# Patient Record
Sex: MALE | Race: WHITE | NOT HISPANIC OR LATINO | Employment: FULL TIME | URBAN - NONMETROPOLITAN AREA
[De-identification: names, ages, dates, MRNs, and addresses within clinical notes are randomized per-mention and may not be internally consistent; named-entity substitution may affect disease eponyms.]

---

## 2021-07-13 ENCOUNTER — APPOINTMENT (OUTPATIENT)
Dept: LAB | Facility: CLINIC | Age: 41
End: 2021-07-13
Payer: COMMERCIAL

## 2021-07-13 ENCOUNTER — OFFICE VISIT (OUTPATIENT)
Dept: INTERNAL MEDICINE CLINIC | Facility: CLINIC | Age: 41
End: 2021-07-13
Payer: COMMERCIAL

## 2021-07-13 VITALS
SYSTOLIC BLOOD PRESSURE: 112 MMHG | BODY MASS INDEX: 27.85 KG/M2 | DIASTOLIC BLOOD PRESSURE: 72 MMHG | WEIGHT: 217 LBS | HEIGHT: 74 IN | OXYGEN SATURATION: 97 % | TEMPERATURE: 98.4 F | HEART RATE: 89 BPM

## 2021-07-13 DIAGNOSIS — F32.A ANXIETY AND DEPRESSION: ICD-10-CM

## 2021-07-13 DIAGNOSIS — R39.198 DIFFICULTY VOIDING: ICD-10-CM

## 2021-07-13 DIAGNOSIS — G89.29 CHRONIC PAIN OF BOTH KNEES: ICD-10-CM

## 2021-07-13 DIAGNOSIS — Z00.00 ROUTINE ADULT HEALTH MAINTENANCE: ICD-10-CM

## 2021-07-13 DIAGNOSIS — F41.9 ANXIETY AND DEPRESSION: ICD-10-CM

## 2021-07-13 DIAGNOSIS — M25.562 CHRONIC PAIN OF BOTH KNEES: ICD-10-CM

## 2021-07-13 DIAGNOSIS — H91.93 BILATERAL HEARING LOSS, UNSPECIFIED HEARING LOSS TYPE: ICD-10-CM

## 2021-07-13 DIAGNOSIS — M25.561 CHRONIC PAIN OF BOTH KNEES: ICD-10-CM

## 2021-07-13 DIAGNOSIS — Z13.29 SCREENING FOR THYROID DISORDER: ICD-10-CM

## 2021-07-13 DIAGNOSIS — Z13.6 SCREENING FOR CARDIOVASCULAR CONDITION: ICD-10-CM

## 2021-07-13 DIAGNOSIS — Z00.00 ROUTINE ADULT HEALTH MAINTENANCE: Primary | ICD-10-CM

## 2021-07-13 LAB
ALBUMIN SERPL BCP-MCNC: 3.8 G/DL (ref 3.5–5)
ALP SERPL-CCNC: 90 U/L (ref 46–116)
ALT SERPL W P-5'-P-CCNC: 42 U/L (ref 12–78)
ANION GAP SERPL CALCULATED.3IONS-SCNC: 5 MMOL/L (ref 4–13)
AST SERPL W P-5'-P-CCNC: 22 U/L (ref 5–45)
BASOPHILS # BLD AUTO: 0.02 THOUSANDS/ΜL (ref 0–0.1)
BASOPHILS NFR BLD AUTO: 0 % (ref 0–1)
BILIRUB SERPL-MCNC: 1.09 MG/DL (ref 0.2–1)
BUN SERPL-MCNC: 14 MG/DL (ref 5–25)
CALCIUM SERPL-MCNC: 9.4 MG/DL (ref 8.3–10.1)
CHLORIDE SERPL-SCNC: 105 MMOL/L (ref 100–108)
CHOLEST SERPL-MCNC: 201 MG/DL (ref 50–200)
CO2 SERPL-SCNC: 27 MMOL/L (ref 21–32)
CREAT SERPL-MCNC: 1.02 MG/DL (ref 0.6–1.3)
EOSINOPHIL # BLD AUTO: 0.12 THOUSAND/ΜL (ref 0–0.61)
EOSINOPHIL NFR BLD AUTO: 2 % (ref 0–6)
ERYTHROCYTE [DISTWIDTH] IN BLOOD BY AUTOMATED COUNT: 12.4 % (ref 11.6–15.1)
GFR SERPL CREATININE-BSD FRML MDRD: 92 ML/MIN/1.73SQ M
GLUCOSE P FAST SERPL-MCNC: 92 MG/DL (ref 65–99)
HCT VFR BLD AUTO: 48.5 % (ref 36.5–49.3)
HDLC SERPL-MCNC: 49 MG/DL
HGB BLD-MCNC: 16.3 G/DL (ref 12–17)
IMM GRANULOCYTES # BLD AUTO: 0.01 THOUSAND/UL (ref 0–0.2)
IMM GRANULOCYTES NFR BLD AUTO: 0 % (ref 0–2)
LDLC SERPL CALC-MCNC: 126 MG/DL (ref 0–100)
LYMPHOCYTES # BLD AUTO: 1.53 THOUSANDS/ΜL (ref 0.6–4.47)
LYMPHOCYTES NFR BLD AUTO: 27 % (ref 14–44)
MCH RBC QN AUTO: 30.4 PG (ref 26.8–34.3)
MCHC RBC AUTO-ENTMCNC: 33.6 G/DL (ref 31.4–37.4)
MCV RBC AUTO: 91 FL (ref 82–98)
MONOCYTES # BLD AUTO: 0.59 THOUSAND/ΜL (ref 0.17–1.22)
MONOCYTES NFR BLD AUTO: 10 % (ref 4–12)
NEUTROPHILS # BLD AUTO: 3.5 THOUSANDS/ΜL (ref 1.85–7.62)
NEUTS SEG NFR BLD AUTO: 61 % (ref 43–75)
NONHDLC SERPL-MCNC: 152 MG/DL
NRBC BLD AUTO-RTO: 0 /100 WBCS
PLATELET # BLD AUTO: 221 THOUSANDS/UL (ref 149–390)
PMV BLD AUTO: 11.6 FL (ref 8.9–12.7)
POTASSIUM SERPL-SCNC: 4.3 MMOL/L (ref 3.5–5.3)
PROT SERPL-MCNC: 8 G/DL (ref 6.4–8.2)
RBC # BLD AUTO: 5.36 MILLION/UL (ref 3.88–5.62)
SODIUM SERPL-SCNC: 137 MMOL/L (ref 136–145)
TRIGL SERPL-MCNC: 128 MG/DL
TSH SERPL DL<=0.05 MIU/L-ACNC: 3.12 UIU/ML (ref 0.36–3.74)
WBC # BLD AUTO: 5.77 THOUSAND/UL (ref 4.31–10.16)

## 2021-07-13 PROCEDURE — 3008F BODY MASS INDEX DOCD: CPT | Performed by: NURSE PRACTITIONER

## 2021-07-13 PROCEDURE — 99385 PREV VISIT NEW AGE 18-39: CPT | Performed by: NURSE PRACTITIONER

## 2021-07-13 PROCEDURE — 3725F SCREEN DEPRESSION PERFORMED: CPT | Performed by: NURSE PRACTITIONER

## 2021-07-13 PROCEDURE — 80053 COMPREHEN METABOLIC PANEL: CPT

## 2021-07-13 PROCEDURE — 80061 LIPID PANEL: CPT

## 2021-07-13 PROCEDURE — 84443 ASSAY THYROID STIM HORMONE: CPT

## 2021-07-13 PROCEDURE — 36415 COLL VENOUS BLD VENIPUNCTURE: CPT

## 2021-07-13 PROCEDURE — 85025 COMPLETE CBC W/AUTO DIFF WBC: CPT

## 2021-07-13 PROCEDURE — 1036F TOBACCO NON-USER: CPT | Performed by: NURSE PRACTITIONER

## 2021-07-13 NOTE — PROGRESS NOTES
Assessment/Plan: Will order fasting labs on patient  Will hold on PSA and will check with his insurance to see if this is covered  He is deferring the covid vaccine  Will notify once labs are back  Patient does contract for safety  Will follow up in one year or sooner if need be  No problem-specific Assessment & Plan notes found for this encounter  Problem List Items Addressed This Visit        Other    Routine adult health maintenance - Primary    Relevant Orders    Comprehensive metabolic panel    CBC and differential    BMI 27 0-27 9,adult    Relevant Orders    Comprehensive metabolic panel    CBC and differential    Anxiety and depression    Relevant Orders    Comprehensive metabolic panel    CBC and differential    Screening for cardiovascular condition    Relevant Orders    Lipid panel    Screening for thyroid disorder    Relevant Orders    TSH, 3rd generation with Free T4 reflex      Other Visit Diagnoses     Difficulty voiding        Relevant Orders    PSA, Total Screen            Subjective:      Patient ID: Roderick Dejesus is a 45 y o  male  Ellan Barrier is to establish care  He has not had a provider in a long time  He denies any medical issues  He states he was in [place] for two years and has been dealing with some anxiety and depression from that  He does not want to take any medication and when he is ready will start counseling  He denies any chest pain, SOB, or palpitations  He does have anxiety and depression but does deal with this on his own  He denies any drug, alcohol, or tobacco use  He states he is having some issue when voiding he feels like he has to continually go and never fully empties his bladder  The following portions of the patient's history were reviewed and updated as appropriate: He  has no past medical history on file    He   Patient Active Problem List    Diagnosis Date Noted    Routine adult health maintenance 07/13/2021    BMI 27 0-27 9,adult 07/13/2021    Anxiety and depression 07/13/2021    Screening for cardiovascular condition 07/13/2021    Screening for thyroid disorder 07/13/2021     He  has no past surgical history on file  His family history is not on file  He  reports that he has never smoked  He has never used smokeless tobacco  He reports that he does not drink alcohol and does not use drugs  No current outpatient medications on file  No current facility-administered medications for this visit  No current outpatient medications on file prior to visit  No current facility-administered medications on file prior to visit  He has No Known Allergies       Review of Systems   Genitourinary: Positive for difficulty urinating  All other systems reviewed and are negative  Objective:      /72 (BP Location: Right arm, Patient Position: Sitting, Cuff Size: Adult)   Pulse 89   Temp 98 4 °F (36 9 °C) (Temporal)   Ht 6' 2" (1 88 m)   Wt 98 4 kg (217 lb)   SpO2 97%   BMI 27 86 kg/m²          Physical Exam  Vitals reviewed  Constitutional:       Appearance: Normal appearance  He is normal weight  HENT:      Head: Normocephalic and atraumatic  Right Ear: Tympanic membrane, ear canal and external ear normal       Left Ear: Tympanic membrane, ear canal and external ear normal       Nose: Nose normal       Mouth/Throat:      Mouth: Mucous membranes are moist       Pharynx: Oropharynx is clear  Eyes:      Extraocular Movements: Extraocular movements intact  Conjunctiva/sclera: Conjunctivae normal       Pupils: Pupils are equal, round, and reactive to light  Cardiovascular:      Rate and Rhythm: Normal rate and regular rhythm  Pulses: Normal pulses  Heart sounds: Normal heart sounds  Pulmonary:      Effort: Pulmonary effort is normal       Breath sounds: Normal breath sounds  Abdominal:      General: Abdomen is flat  Bowel sounds are normal       Palpations: Abdomen is soft     Musculoskeletal: General: Normal range of motion  Cervical back: Normal range of motion and neck supple  Skin:     General: Skin is warm and dry  Capillary Refill: Capillary refill takes less than 2 seconds  Neurological:      General: No focal deficit present  Mental Status: He is alert and oriented to person, place, and time  Mental status is at baseline  Psychiatric:         Mood and Affect: Mood normal          Behavior: Behavior normal          Thought Content: Thought content normal          Judgment: Judgment normal          BMI Counseling: Body mass index is 27 86 kg/m²  The BMI is above normal  Nutrition recommendations include reducing portion sizes, decreasing overall calorie intake, 3-5 servings of fruits/vegetables daily, reducing fast food intake, consuming healthier snacks, decreasing soda and/or juice intake, moderation in carbohydrate intake, increasing intake of lean protein, reducing intake of saturated fat and trans fat and reducing intake of cholesterol

## 2021-07-13 NOTE — PATIENT INSTRUCTIONS
Low Fat Diet   AMBULATORY CARE:   A low-fat diet  is an eating plan that is low in total fat, unhealthy fat, and cholesterol  You may need to follow a low-fat diet if you have trouble digesting or absorbing fat  You may also need to follow this diet if you have high cholesterol  You can also lower your cholesterol by increasing the amount of fiber in your diet  Soluble fiber is a type of fiber that helps to decrease cholesterol levels  Different types of fat in food:   · Limit unhealthy fats  A diet that is high in cholesterol, saturated fat, and trans fat may cause unhealthy cholesterol levels  Unhealthy cholesterol levels increase your risk of heart disease  ? Cholesterol:  Limit intake of cholesterol to less than 200 mg per day  Cholesterol is found in meat, eggs, and dairy  ? Saturated fat:  Limit saturated fat to less than 7% of your total daily calories  Ask your dietitian how many calories you need each day  Saturated fat is found in butter, cheese, ice cream, whole milk, and palm oil  Saturated fat is also found in meat, such as beef, pork, chicken skin, and processed meats  Processed meats include sausage, hot dogs, and bologna  ? Trans fat:  Avoid trans fat as much as possible  Trans fat is used in fried and baked foods  Foods that say trans fat free on the label may still have up to 0 5 grams of trans fat per serving  · Include healthy fats  Replace foods that are high in saturated and trans fat with foods high in healthy fats  This may help to decrease high cholesterol levels  ? Monounsaturated fats: These are found in avocados, nuts, and vegetable oils, such as olive, canola, and sunflower oil  ? Polyunsaturated fats: These can be found in vegetable oils, such as soybean or corn oil  Omega-3 fats can help to decrease the risk of heart disease  Omega-3 fats are found in fish, such as salmon, herring, trout, and tuna   Omega-3 fats can also be found in plant foods, such as walnuts, flaxseed, soybeans, and canola oil  Foods to limit or avoid:   · Grains:      ? Snacks that are made with partially hydrogenated oils, such as chips, regular crackers, and butter-flavored popcorn    ? High-fat baked goods, such as biscuits, croissants, doughnuts, pies, cookies, and pastries    · Dairy:      ? Whole milk, 2% milk, and yogurt and ice cream made with whole milk    ? Half and half creamer, heavy cream, and whipping cream    ? Cheese, cream cheese, and sour cream    · Meats and proteins:      ? High-fat cuts of meat (T-bone steak, regular hamburger, and ribs)    ? Fried meat, poultry (turkey and chicken), and fish    ? Poultry (chicken and turkey) with skin    ? Cold cuts (salami or bologna), hot dogs, crowley, and sausage    ? Whole eggs and egg yolks    · Vegetables and fruits with added fat:      ? Fried vegetables or vegetables in butter or high-fat sauces, such as cream or cheese sauces    ? Fried fruit or fruit served with butter or cream    · Fats:      ? Butter, stick margarine, and shortening    ? Coconut, palm oil, and palm kernel oil    Foods to include:   · Grains:      ? Whole-grain breads, cereals, pasta, and brown rice    ? Low-fat crackers and pretzels    · Vegetables and fruits:      ? Fresh, frozen, or canned vegetables (no salt or low-sodium)    ? Fresh, frozen, dried, or canned fruit (canned in light syrup or fruit juice)    ? Avocado    · Low-fat dairy products:      ? Nonfat (skim) or 1% milk    ? Nonfat or low-fat cheese, yogurt, and cottage cheese    · Meats and proteins:      ? Chicken or turkey with no skin    ? Baked or broiled fish    ? Lean beef and pork (loin, round, extra lean hamburger)    ? Beans and peas, unsalted nuts, soy products    ? Egg whites and substitutes    ? Seeds and nuts    · Fats:      ? Unsaturated oil, such as canola, olive, peanut, soybean, or sunflower oil    ? Soft or liquid margarine and vegetable oil spread    ?  Low-fat salad dressing    Other ways to decrease fat:   · Read food labels before you buy foods  Choose foods that have less than 30% of calories from fat  Choose low-fat or fat-free dairy products  Remember that fat free does not mean calorie free  These foods still contain calories, and too many calories can lead to weight gain  · Trim fat from meat and avoid fried food  Trim all visible fat from meat before you cook it  Remove the skin from poultry  Do not willard meat, fish, or poultry  Bake, roast, boil, or broil these foods instead  Avoid fried foods  Eat a baked potato instead of [place] fries  Steam vegetables instead of sautéing them in butter  · Add less fat to foods  Use imitation crowley bits on salads and baked potatoes instead of regular crowley bits  Use fat-free or low-fat salad dressings instead of regular dressings  Use low-fat or nonfat butter-flavored topping instead of regular butter or margarine on popcorn and other foods  Ways to decrease fat in recipes:  Replace high-fat ingredients with low-fat or nonfat ones  This may cause baked goods to be drier than usual  You may need to use nonfat cooking spray on pans to prevent food from sticking  You also may need to change the amount of other ingredients, such as water, in the recipe  Try the following:  · Use low-fat or light margarine instead of regular margarine or shortening  · Use lean ground turkey breast or chicken, or lean ground beef (less than 5% fat) instead of hamburger  · Add 1 teaspoon of canola oil to 8 ounces of skim milk instead of using cream or half and half  · Use grated zucchini, carrots, or apples in breads instead of coconut  · Use blenderized, low-fat cottage cheese, plain tofu, or low-fat ricotta cheese instead of cream cheese  · Use 1 egg white and 1 teaspoon of canola oil, or use ¼ cup (2 ounces) of fat-free egg substitute instead of a whole egg       · Replace half of the oil that is called for in a recipe with applesauce when you bake  Use 3 tablespoons of cocoa powder and 1 tablespoon of canola oil instead of a square of baking chocolate  How to increase fiber:  Eat enough high-fiber foods to get 20 to 30 grams of fiber every day  Slowly increase your fiber intake to avoid stomach cramps, gas, and other problems  · Eat 3 ounces of whole-grain foods each day  An ounce is about 1 slice of bread  Eat whole-grain breads, such as whole-wheat bread  Whole wheat, whole-wheat flour, or other whole grains should be listed as the first ingredient on the food label  Replace white flour with whole-grain flour or use half of each in recipes  Whole-grain flour is heavier than white flour, so you may have to add more yeast or baking powder  · Eat a high-fiber cereal for breakfast   Oatmeal is a good source of soluble fiber  Look for cereals that have bran or fiber in the name  Choose whole-grain products, such as brown rice, barley, and whole-wheat pasta  · Eat more beans, peas, and lentils  For example, add beans to soups or salads  Eat at least 5 cups of fruits and vegetables each day  Eat fruits and vegetables with the peel because the peel is high in fiber  © Copyright [address] Information is for End User's use only and may not be sold, redistributed or otherwise used for commercial purposes  All illustrations and images included in CareNotes® are the copyrighted property of A D A M , [employer]  or [address] [place] [address]  The above information is an  only  It is not intended as medical advice for individual conditions or treatments  Talk to your doctor, nurse or pharmacist before following any medical regimen to see if it is safe and effective for you  Heart Healthy Diet   AMBULATORY CARE:   A heart healthy diet  is an eating plan low in unhealthy fats and sodium (salt)  The plan is high in healthy fats and fiber   A heart healthy diet helps improve your cholesterol levels and lowers your risk for heart disease and stroke  A dietitian will teach you how to read and understand food labels  Heart healthy diet guidelines to follow:   · Choose foods that contain healthy fats  ? Unsaturated fats  include monounsaturated and polyunsaturated fats  Unsaturated fat is found in foods such as soybean, canola, olive, corn, and safflower oils  It is also found in soft tub margarine that is made with liquid vegetable oil  ? Omega-3 fat  is found in certain fish, such as salmon, tuna, and trout, and in walnuts and flaxseed  Eat fish high in omega-3 fats at least 2 times a week  · Get 20 to 30 grams of fiber each day  Fruits, vegetables, whole-grain foods, and legumes (cooked beans) are good sources of fiber  · Limit or do not have unhealthy fats  ? Cholesterol  is found in animal foods, such as eggs and lobster, and in dairy products made from whole milk  Limit cholesterol to less than 200 mg each day  ? Saturated fat  is found in meats, such as crowley and hamburger  It is also found in chicken or turkey skin, whole milk, and butter  Limit saturated fat to less than 7% of your total daily calories  ? Trans fat  is found in packaged foods, such as potato chips and cookies  It is also in hard margarine, some fried foods, and shortening  Do not eat foods that contain trans fats  · Limit sodium as directed  You may be told to limit sodium to 2,000 to 2,300 mg each day  Choose low-sodium or no-salt-added foods  Add little or no salt to food you prepare  Use herbs and spices in place of salt  Include the following in your heart healthy plan:  Ask your dietitian or healthcare provider how many servings to have from each of the following food groups:  · Grains:      ? Whole-wheat breads, cereals, and pastas, and brown rice    ? Low-fat, low-sodium crackers and chips    · Vegetables:      ? Broccoli, green beans, green peas, and spinach    ? Collards, kale, and lima beans    ?  Carrots, sweet potatoes, tomatoes, and peppers    ? Canned vegetables with no salt added    · Fruits:      ? Bananas, peaches, pears, and pineapple    ? Grapes, raisins, and dates    ? Oranges, tangerines, grapefruit, orange juice, and grapefruit juice    ? Apricots, mangoes, melons, and papaya    ? Raspberries and strawberries    ? Canned fruit with no added sugar    · Low-fat dairy:      ? Nonfat (skim) milk, 1% milk, and low-fat almond, cashew, or soy milks fortified with calcium    ? Low-fat cheese, regular or frozen yogurt, and cottage cheese    · Meats and proteins:      ? Lean cuts of beef and pork (loin, leg, round), skinless chicken and turkey    ? Legumes, soy products, egg whites, or nuts    Limit or do not include the following in your heart healthy plan:   · Unhealthy fats and oils:      ? Whole or 2% milk, cream cheese, sour cream, or cheese    ? High-fat cuts of beef (T-bone steaks, ribs), chicken or turkey with skin, and organ meats such as liver    ? Butter, stick margarine, shortening, and cooking oils such as coconut or palm oil    · Foods and liquids high in sodium:      ? Packaged foods, such as frozen dinners, cookies, macaroni and cheese, and cereals with more than 300 mg of sodium per serving    ? Vegetables with added sodium, such as instant potatoes, vegetables with added sauces, or regular canned vegetables    ? Cured or smoked meats, such as hot dogs, crowley, and sausage    ? High-sodium ketchup, barbecue sauce, salad dressing, pickles, olives, soy sauce, or miso    · Foods and liquids high in sugar:      ? Candy, cake, cookies, pies, or doughnuts    ? Soft drinks (soda), sports drinks, or sweetened tea    ? Canned or dry mixes for cakes, soups, sauces, or gravies    Other healthy heart guidelines:   · Do not smoke  Nicotine and other chemicals in cigarettes and cigars can cause lung and heart damage  Ask your healthcare provider for information if you currently smoke and need help to quit  E-cigarettes or smokeless tobacco still contain nicotine  Talk to your healthcare provider before you use these products  · Limit or do not drink alcohol as directed  Alcohol can damage your heart and raise your blood pressure  Your healthcare provider may give you specific daily and weekly limits  The general recommended limit is 1 drink a day for women 21 or older and for men 72 or older  Do not have more than 3 drinks in a day or 7 in a week  The recommended limit is 2 drinks a day for men 24to 59years of age  Do not have more than 4 drinks in a day or 14 in a week  A drink of alcohol is 12 ounces of beer, 5 ounces of wine, or 1½ ounces of liquor  · Exercise regularly  Exercise can help you maintain a healthy weight and improve your blood pressure and cholesterol levels  Regular exercise can also decrease your risk for heart problems  Ask your healthcare provider about the best exercise plan for you  Do not start an exercise program without asking your healthcare provider  Follow up with your doctor or cardiologist as directed:  Write down your questions so you remember to ask them during your visits  © Copyright [address] Information is for End User's use only and may not be sold, redistributed or otherwise used for commercial purposes  All illustrations and images included in CareNotes® are the copyrighted property of A D A M , Inc  or [address] [place] [address]  The above information is an  only  It is not intended as medical advice for individual conditions or treatments  Talk to your doctor, nurse or pharmacist before following any medical regimen to see if it is safe and effective for you

## 2021-08-09 ENCOUNTER — TELEPHONE (OUTPATIENT)
Dept: OTHER | Facility: OTHER | Age: 41
End: 2021-08-09

## 2021-08-09 NOTE — TELEPHONE ENCOUNTER
Patient canceled today's appointment  Patient has to work  Patient advised to call back during office hours to reschedule appointment

## 2021-08-12 ENCOUNTER — OFFICE VISIT (OUTPATIENT)
Dept: AUDIOLOGY | Facility: CLINIC | Age: 41
End: 2021-08-12
Payer: COMMERCIAL

## 2021-08-12 ENCOUNTER — APPOINTMENT (OUTPATIENT)
Dept: RADIOLOGY | Facility: CLINIC | Age: 41
End: 2021-08-12
Payer: COMMERCIAL

## 2021-08-12 VITALS
HEIGHT: 74 IN | WEIGHT: 217 LBS | SYSTOLIC BLOOD PRESSURE: 123 MMHG | DIASTOLIC BLOOD PRESSURE: 90 MMHG | TEMPERATURE: 98.1 F | BODY MASS INDEX: 27.85 KG/M2 | HEART RATE: 76 BPM

## 2021-08-12 DIAGNOSIS — M25.562 CHRONIC PAIN OF BOTH KNEES: ICD-10-CM

## 2021-08-12 DIAGNOSIS — G89.29 CHRONIC PAIN OF BOTH KNEES: ICD-10-CM

## 2021-08-12 DIAGNOSIS — H91.93 BILATERAL HEARING LOSS, UNSPECIFIED HEARING LOSS TYPE: ICD-10-CM

## 2021-08-12 DIAGNOSIS — H93.13 TINNITUS OF BOTH EARS: Primary | ICD-10-CM

## 2021-08-12 DIAGNOSIS — M25.561 CHRONIC PAIN OF BOTH KNEES: ICD-10-CM

## 2021-08-12 DIAGNOSIS — M17.0 OSTEOARTHRITIS OF BOTH KNEES, UNSPECIFIED OSTEOARTHRITIS TYPE: Primary | ICD-10-CM

## 2021-08-12 PROCEDURE — 73564 X-RAY EXAM KNEE 4 OR MORE: CPT

## 2021-08-12 PROCEDURE — 99204 OFFICE O/P NEW MOD 45 MIN: CPT | Performed by: FAMILY MEDICINE

## 2021-08-12 PROCEDURE — 1036F TOBACCO NON-USER: CPT | Performed by: FAMILY MEDICINE

## 2021-08-12 PROCEDURE — 92567 TYMPANOMETRY: CPT | Performed by: AUDIOLOGIST-HEARING AID FITTER

## 2021-08-12 PROCEDURE — 3008F BODY MASS INDEX DOCD: CPT | Performed by: FAMILY MEDICINE

## 2021-08-12 PROCEDURE — 92552 PURE TONE AUDIOMETRY AIR: CPT | Performed by: AUDIOLOGIST-HEARING AID FITTER

## 2021-08-12 PROCEDURE — 92556 SPEECH AUDIOMETRY COMPLETE: CPT | Performed by: AUDIOLOGIST-HEARING AID FITTER

## 2021-08-12 NOTE — PROGRESS NOTES
HEARING EVALUATION    Name:  Frandy Cortes  :  1980  Age:  37 y o  Date of Evaluation: 21     History: Tinnitus  Reason for visit: Frandy Cortes is being seen today at the request of Dr Mariela Mei for an evaluation of hearing  Sherrillclara Goodman reports a bilateral intermittent tinnitus that is considered to be very bothersome  There is some hearing difficulty noted  Denied ear pain, aural fullness, and a family history of hearing loss  There is an extensive history of loud noise exposure through [employer]  EVALUATION:    Otoscopic Evaluation:   Right Ear: Clear and healthy ear canal and tympanic membrane   Left Ear: Clear and healthy ear canal and tympanic membrane    Tympanometry:   Right: Type A - normal middle ear pressure and compliance   Left: Type A - normal middle ear pressure and compliance    Audiogram Results:  Pure tone testing revealed normal hearing sensitivity bilaterally  SRT and PTA are in agreement indicating good test reliability  Word recognition scores were  excellent bilaterally  *see attached audiogram      RECOMMENDATIONS:  Annual hearing eval and Return to [facility]  for F/U    PATIENT EDUCATION:   Discussed results and recommendations with Sherrill Goodman  Provided brief counseling on tinnitus and encouraged taking advantage of [facility] services  Questions were addressed and the patient was encouraged to contact our department should concerns arise        Marii Brantley   Clinical Audiologist

## 2021-08-12 NOTE — PATIENT INSTRUCTIONS
F/u 6 wks  Begin physical therapy  Consider injection  Possible L medial meniscal tear  Icing/heat/OTC pain meds as needed  Home exercises  Knee Exercises   AMBULATORY CARE:   What you need to know about knee exercises:  Knee exercises help strengthen the muscles around your knee  Strong muscles can help reduce pain and decrease your risk of future injury  Knee exercises also help you heal after an injury or surgery  · Start slow  These are beginning exercises  Ask your healthcare provider if you need to see a physical therapist for more advanced exercises  As you get stronger, you may be able to do more sets of each exercise or add weights  · Stop if you feel pain  It is normal to feel some discomfort at first  Regular exercise will help decrease your discomfort over time  · Do the exercises on both legs  Do this so both knees remain strong  · Warm up before you do knee exercises  Walk or ride a stationary bike for 5 or 10 minutes to warm your muscles  How to perform knee stretches safely:  Always stretch before you do strengthening exercises  Do these stretching exercises again after you do the strengthening exercises  Do these stretches 4 or 5 days a week, or as directed  · Standing calf stretch: Face a wall and place both palms flat on the wall, or hold the back of a chair for balance  Keep a slight bend in your knees  Take a big step backward with one leg  Keep your other leg directly under you  Keep both heels flat and press your hips forward  Hold the stretch for 30 seconds, and then relax for 30 seconds  Switch legs  Repeat 2 or 3 times on each leg  · Standing quadriceps stretch:  Stand and place one hand against a wall or hold the back of a chair for balance  With your weight on one leg, bend your other leg and grab your ankle  Bring your heel toward your buttocks  Hold the stretch for 30 to 60 seconds  Switch legs  Repeat 2 or 3 times on each leg           · Sitting hamstring stretch:  Sit with both legs straight in front of you  Do not point or flex your toes  Place your palms on the floor and slide your hands forward until you feel the stretch  Do not round your back  Hold the stretch for 30 seconds  Repeat 2 or 3 times  How to perform knee strengthening exercises safely:  Do these exercises 4 or 5 days a week, or as directed  · Standing half squats:  Stand with your feet shoulder-width apart  Lean your back against a wall or hold the back of a chair for balance, if needed  Slowly sit down about 10 inches, as if you are going to sit in a chair  Your body weight should be mostly over your heels  Hold the squat for 5 seconds, then rise to a standing position  Do 3 sets of 10 squats to strengthen your buttocks and thighs  · Standing hamstring curls: Face a wall and place both palms flat on the wall, or hold the back of a chair for balance  With your weight on one leg, lift your other foot as close to your buttocks as you can  Hold for 5 seconds and then lower your leg  Do 2 sets of 10 curls on each leg  This exercise strengthens the muscles in the back of your thigh  · Standing calf raises:  Face a wall and place both palms flat on the wall, or hold the back of a chair for balance  Stand up straight, and do not lean  Place all your weight on one leg by lifting the other foot off the floor  Raise the heel of the foot that is on the floor as high as you can and then lower it  Do 2 sets of 10 calf raises on each leg to strengthen your calf muscles  · Straight leg lifts:  Lie on your stomach with straight legs  Fold your arms in front of you and rest your head in your arms  Tighten your leg muscles and raise one leg as high as you can  Hold for 5 seconds, then lower your leg  Do 2 sets of 10 lifts on each leg to strengthen your buttocks  · Sitting leg lifts:  Sit in a chair  Slowly straighten and raise one leg   Squeeze your thigh muscles and hold for 5 seconds  Relax and return your foot to the floor  Do 2 sets of 10 lifts on each leg  This helps strengthen the muscles in the front of your thigh  Contact your healthcare provider if:   · You have new pain or your pain becomes worse  · You have questions or concerns about your condition or care  © Copyright [employer] 2021 Information is for End User's use only and may not be sold, redistributed or otherwise used for commercial purposes  All illustrations and images included in CareNotes® are the copyrighted property of [employer]  or [facility] Vianey Sheffield   The above information is an  only  It is not intended as medical advice for individual conditions or treatments  Talk to your doctor, nurse or pharmacist before following any medical regimen to see if it is safe and effective for you

## 2021-08-12 NOTE — PROGRESS NOTES
[facility] [place] [address]  [facility] [address]  [phone]      Chief Complaint:  Chief Complaint   Patient presents with    Left Knee - Pain    Right Knee - Pain       Vitals:  /90   Pulse 76   Temp 98 1 °F (36 7 °C) (Tympanic)   Ht 6' 2" (1 88 m)   Wt 98 4 kg (217 lb)   BMI 27 86 kg/m²     The following portions of the patient's history were reviewed and updated as appropriate: allergies, current medications, past family history, past medical history, past social history, past surgical history, and problem list       Subjective:   Patient ID: Iris Fraser is a 37 y o  male  Here c/o B knee pain  Pain started about 15 years ago  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 but he didn't say anything- soreness  Pain is getting worse in the last 10 days  His L knee gives out  L worse than R  Denies locks  Swelling in the winter  No pain meds or icing  Using ronnie copper knee braces- helps in winter  Sharp pain  Worse with walking on ballasts  Had XR in army- showed arthritis  Review of Systems   Constitutional: Negative for fatigue and fever  Respiratory: Negative for shortness of breath  Cardiovascular: Negative for chest pain  Gastrointestinal: Negative for abdominal pain and nausea  Genitourinary: Negative for dysuria  Musculoskeletal: Positive for arthralgias and gait problem  Skin: Negative for rash and wound  Neurological: Negative for weakness and headaches  Objective:  Right Knee Exam   Right knee exam is normal     Muscle Strength   The patient has normal right knee strength  Tenderness   The patient is experiencing no tenderness  Other   Swelling: none  Effusion: no effusion present      Left Knee Exam     Muscle Strength   The patient has normal left knee strength  Tenderness   The patient is experiencing tenderness in the medial joint line      Range of Motion The patient has normal left knee ROM  Tests   Johan:  Medial - positive Lateral - negative  Varus: negative Valgus: negative    Other   Swelling: none  Effusion: no effusion present          Observations   Left Knee   Negative for effusion  Right Knee   Negative for effusion  Physical Exam  Vitals and nursing note reviewed  Constitutional:       Appearance: Normal appearance  He is well-developed  HENT:      Head: Normocephalic  Mouth/Throat:      Mouth: Mucous membranes are moist    Eyes:      Extraocular Movements: Extraocular movements intact  Cardiovascular:      Rate and Rhythm: Normal rate and regular rhythm  Heart sounds: Normal heart sounds  Pulmonary:      Effort: Pulmonary effort is normal       Breath sounds: Normal breath sounds  Abdominal:      General: Bowel sounds are normal       Palpations: Abdomen is soft  Musculoskeletal:         General: Tenderness present  Normal range of motion  Cervical back: Normal range of motion  Right knee: No effusion  Left knee: No effusion  Instability Tests: Medial Johan test positive  Lateral Johan test negative  Skin:     General: Skin is warm and dry  Neurological:      General: No focal deficit present  Mental Status: He is alert and oriented to person, place, and time  Psychiatric:         Mood and Affect: Mood normal          Behavior: Behavior normal          Thought Content: Thought content normal          I have personally reviewed pertinent films in PACS and my interpretation is XR-  B knee- mild DJD no fx  Assessment/Plan:  Assessment/Plan   Diagnoses and all orders for this visit:    Osteoarthritis of both knees, unspecified osteoarthritis type  -     Ambulatory referral to Physical Therapy; Future    Chronic pain of both knees  -     Ambulatory referral to Orthopedic Surgery  -     XR knee 4+ vw right injury; Future  -     XR knee 4+ vw left injury;  Future  -     Ambulatory referral to Physical Therapy; Future        Return in about 6 weeks (around 9/23/2021) for Recheck       Lana Kramer MD

## 2021-10-04 ENCOUNTER — OFFICE VISIT (OUTPATIENT)
Dept: OBGYN CLINIC | Facility: CLINIC | Age: 41
End: 2021-10-04
Payer: COMMERCIAL

## 2021-10-04 ENCOUNTER — TELEPHONE (OUTPATIENT)
Dept: INTERNAL MEDICINE CLINIC | Facility: CLINIC | Age: 41
End: 2021-10-04

## 2021-10-04 VITALS
SYSTOLIC BLOOD PRESSURE: 114 MMHG | DIASTOLIC BLOOD PRESSURE: 80 MMHG | BODY MASS INDEX: 28.27 KG/M2 | HEART RATE: 80 BPM | WEIGHT: 220.2 LBS

## 2021-10-04 DIAGNOSIS — S83.207A POSITIVE MCMURRAY TEST OF LEFT KNEE, INITIAL ENCOUNTER: Primary | ICD-10-CM

## 2021-10-04 DIAGNOSIS — M25.562 CHRONIC PAIN OF LEFT KNEE: ICD-10-CM

## 2021-10-04 DIAGNOSIS — G89.29 CHRONIC PAIN OF LEFT KNEE: ICD-10-CM

## 2021-10-04 PROCEDURE — 1036F TOBACCO NON-USER: CPT | Performed by: FAMILY MEDICINE

## 2021-10-04 PROCEDURE — 99214 OFFICE O/P EST MOD 30 MIN: CPT | Performed by: FAMILY MEDICINE

## 2021-10-14 ENCOUNTER — HOSPITAL ENCOUNTER (OUTPATIENT)
Dept: MRI IMAGING | Facility: HOSPITAL | Age: 41
Discharge: HOME | End: 2021-10-14
Attending: FAMILY MEDICINE

## 2021-10-14 DIAGNOSIS — S83.207A POSITIVE MCMURRAY TEST OF LEFT KNEE, INITIAL ENCOUNTER: ICD-10-CM

## 2021-10-14 DIAGNOSIS — G89.29 CHRONIC PAIN OF LEFT KNEE: ICD-10-CM

## 2021-10-14 DIAGNOSIS — M25.562 CHRONIC PAIN OF LEFT KNEE: ICD-10-CM

## 2022-04-07 ENCOUNTER — OFFICE VISIT (OUTPATIENT)
Dept: URGENT CARE | Facility: CLINIC | Age: 42
End: 2022-04-07
Payer: COMMERCIAL

## 2022-04-07 ENCOUNTER — APPOINTMENT (OUTPATIENT)
Dept: RADIOLOGY | Facility: CLINIC | Age: 42
End: 2022-04-07
Payer: COMMERCIAL

## 2022-04-07 VITALS
TEMPERATURE: 97.9 F | OXYGEN SATURATION: 97 % | HEART RATE: 80 BPM | DIASTOLIC BLOOD PRESSURE: 78 MMHG | RESPIRATION RATE: 18 BRPM | SYSTOLIC BLOOD PRESSURE: 136 MMHG

## 2022-04-07 DIAGNOSIS — S59.911A RIGHT FOREARM INJURY, INITIAL ENCOUNTER: ICD-10-CM

## 2022-04-07 DIAGNOSIS — S51.811A LACERATION OF RIGHT FOREARM WITHOUT COMPLICATION, INITIAL ENCOUNTER: Primary | ICD-10-CM

## 2022-04-07 PROCEDURE — G0382 LEV 3 HOSP TYPE B ED VISIT: HCPCS

## 2022-04-07 PROCEDURE — 73090 X-RAY EXAM OF FOREARM: CPT

## 2022-04-07 PROCEDURE — 90471 IMMUNIZATION ADMIN: CPT

## 2022-04-07 PROCEDURE — 90715 TDAP VACCINE 7 YRS/> IM: CPT

## 2022-04-07 PROCEDURE — 12001 RPR S/N/AX/GEN/TRNK 2.5CM/<: CPT

## 2022-04-07 RX ORDER — LIDOCAINE HYDROCHLORIDE AND EPINEPHRINE 10; 10 MG/ML; UG/ML
1 INJECTION, SOLUTION INFILTRATION; PERINEURAL ONCE
Status: DISCONTINUED | OUTPATIENT
Start: 2022-04-07 | End: 2022-04-07

## 2022-04-07 NOTE — LETTER
April 7, 2022     Patient: Ivan Osborn   YOB: 1980   Date of Visit: 4/7/2022       To Whom It May Concern:    Patient was seen on 04/7/2022  He may return to work on 04/7/2022  If you have any questions or concerns, please don't hesitate to call           Sincerely,        REGINALDO Benitez    CC: No Recipients

## 2022-04-07 NOTE — PATIENT INSTRUCTIONS
Return in 7-10 days for suture removal  Keep your wound clean and dry  Keep your wound covered while at work  Monitor for signs and symptoms of infection  PCP follow-up in 3-5 days  Proceed to ER if symptoms worsen  Acetaminophen or ibuprofen for pain  Care For Your Stitches   AMBULATORY CARE:   Stitches,  or sutures, are used to close cuts and wounds on the skin  Stitches need to be removed after your wound has healed  Seek care immediately if:   · Your stitches come apart  · Blood soaks through your bandages  · You suddenly cannot move your injured joint  · You have sudden numbness around your wound  · You see red streaks coming from your wound  Contact your healthcare provider if:   · You have a fever and chills  · Your wound is red, warm, swollen, or leaking pus  · There is a bad smell coming from your wound  · You have increased pain in the wound area  · You have questions or concerns about your condition or care  Care for your stitches:   · Protect the stitches  You may need to cover your stitches with a bandage for 24 to 48 hours, or as directed  Do not bump or hit the suture area  This could open the wound  Do not trim or shorten the ends of your stitches  If they rub on your clothing, put a gauze bandage between the stitches and your clothes  · Clean the area as directed  Carefully wash your wound with soap and water  For mouth and lip wounds, rinse your mouth after meals and at bedtime  Ask your healthcare provider what to use to rinse your mouth  If you have a scalp wound, you may gently wash your hair every 2 days with mild shampoo  Do not use hair products, such as hair spray  Check your wound for signs of infection when you clean it  Signs include redness, swelling, and pus  · Keep the area dry as directed  Wait 12 to 24 hours after you receive your stitches before you take a shower  Take showers instead of baths  Do not take a bath or swim   Your healthcare provider will give you instructions for bathing with your stitches  Help your wound heal:   · Elevate your wound  Keep your wound above the level of your heart as often as you can  This will help decrease swelling and pain  Prop the area on pillows or blankets, if possible, to keep it elevated comfortably  · Limit activity  Do not stretch the skin around your wound  This will help prevent bleeding and swelling  Follow up with your healthcare provider as directed: You may need to return to have your stitches removed  Write down your questions so you remember to ask them during your visits  © Copyright [employer] 2022 Information is for End User's use only and may not be sold, redistributed or otherwise used for commercial purposes  All illustrations and images included in CareNotes® are the copyrighted property of [employer]  or Edel Tucker  The above information is an  only  It is not intended as medical advice for individual conditions or treatments  Talk to your doctor, nurse or pharmacist before following any medical regimen to see if it is safe and effective for you  Acute Wound Care   AMBULATORY CARE:   An acute wound  is an injury that causes a break in the skin  An acute wound can happen suddenly, last a short time, and may heal on its own  Common signs and symptoms of an acute wound:   · A cut, tear, or gash in your skin    · Bleeding, swelling, pain, or trouble moving the affected area    · Dirt or foreign objects inside the wound     · Milky, yellow, green, or brown pus in the wound     · Red, tender, or warm area around the pus    · Fever  Seek care immediately if:   · You have pus or a foul odor coming from the wound  · You have sudden trouble breathing or chest pain  · Blood soaks through your bandage  Contact your healthcare provider if:   · You have muscle, joint, or body aches, sweating, or a fever      · You have more swelling, redness, or bleeding in your wound  · Your skin is itchy, swollen, or you have a rash  · You have questions or concerns about your condition or care  Treatment for an acute wound  may include any of the following:  · Cleansing  is done with soap and water to wash away germs and decrease the risk of infection  Sterile water further cleans the wound  The cleaning is done under high pressure with a catheter tip and large syringe  A solution that kills germs may also be used  · Debridement  is done to clean and remove objects, dirt, or dead tissues from the open wound  Healthcare providers may also drain the wound to clean out pus  · Closure of the wound  is done with stitches, staples, skin adhesive, or other treatments  This may be done if the wound is wide or deep  Stitches may be needed if the wound is in an area that moves a lot, such as the hands, feet, and joints  Stitches may help to keep the wound from getting infected  They may also decrease the amount of scarring you have  Some wounds may heal better without stitches  Wound care:   · If your wound was closed with thin strips of medical tape, keep them clean and dry  The strips of medical tape will fall off on their own  Do not pull them off  · Keep the bandage clean and dry  Do not remove the bandage over your wound unless your healthcare provider says it is okay  · Wash your hands before and after you take care of your wound to prevent infection  · Clean the wound as directed  If you cannot reach the wound, have someone help you  · If you have packing, make sure all the gauze used to pack the wound is taken out and replaced as directed  Keep track of how many gauze dressings are placed inside the wound  Follow up with your healthcare provider as directed:  Write down your questions so you remember to ask them during your visits     © 2016 [address] is for End User's use only and may not be sold, redistributed or otherwise used for commercial purposes  All illustrations and images included in CareNotes® are the copyrighted property of [employer] M , Inc  or You Kumar  The above information is an  only  It is not intended as medical advice for individual conditions or treatments  Talk to your doctor, nurse or pharmacist before following any medical regimen to see if it is safe and effective for you

## 2022-04-07 NOTE — PROGRESS NOTES
[facility]        NAME: Maurisio Malik is a 37 y o  male  : 1980    MRN: [id number]  DATE: 2022  TIME: 12:34 PM      Assessment and Plan     Laceration of right forearm without complication, initial encounter [S57 [id number]]  1  Laceration of right forearm without complication, initial encounter  Tdap Vaccine greater than or equal to 6yo    lidocaine-epinephrine (XYLOCAINE/EPINEPHRINE) 1 %-1:100,000 injection 1 mL    Laceration repair   2  Right forearm injury, initial encounter  XR forearm 2 vw right     tdap given IM  Wet read xray of right forearm shows soft tissue swelling at laceration/avulsion site  No acute bony abnormalities  Will monitor for final read  Patient Instructions   Return in 7-10 days for suture removal  Keep your wound clean and dry  Keep your wound covered while at work  Monitor for signs and symptoms of infection  PCP follow-up in 3-5 days  Proceed to ER if symptoms worsen  Acetaminophen or ibuprofen for pain  Chief Complaint     Chief Complaint   Patient presents with    Laceration     right arm         History of Present Illness     Patient is a 16-year-old male who presents status post fall  States that he fell around 10:30 am in the bathroom  States he slipped on water, falling onto his right arm  States he cut his right arm on the track of a previous sliding glass door  States the sliding door is no longer there  Denies broken glass  Denies hitting head  Denies neck or back pain  Denies LOC  No use of blood thinners or antiplatelets  Patient unsure last tetanus  Review of Systems     Review of Systems   Constitutional: Negative for chills and fever  Eyes: Negative for visual disturbance  Gastrointestinal: Negative for diarrhea, nausea and vomiting  Musculoskeletal: Negative for back pain, joint swelling, neck pain and neck stiffness  Skin: Positive for wound (right forearm and right wrist)     Neurological: Negative for dizziness and headaches  All other systems reviewed and are negative  Current Medications     No current outpatient medications on file  Current Facility-Administered Medications:     lidocaine-epinephrine (XYLOCAINE/EPINEPHRINE) 1 %-1:100,000 injection 1 mL, 1 mL, Infiltration, Once, REGINALDO Pardo    Current Allergies     Allergies as of 04/07/2022    (No Known Allergies)              The following portions of the patient's history were reviewed and updated as appropriate: allergies, current medications, past family history, past medical history, past social history, past surgical history and problem list      History reviewed  No pertinent past medical history  History reviewed  No pertinent surgical history  History reviewed  No pertinent family history  Medications have been verified  Objective     /78   Pulse 80   Temp 97 9 °F (36 6 °C)   Resp 18   SpO2 97%   No LMP for male patient  Physical Exam     Physical Exam  Vitals and nursing note reviewed  Constitutional:       General: He is awake  He is not in acute distress  Appearance: Normal appearance  He is not ill-appearing, toxic-appearing or diaphoretic  HENT:      Head: Normocephalic and atraumatic  Right Ear: No hemotympanum  Left Ear: No hemotympanum  Nose: Nose normal       Mouth/Throat:      Lips: Pink  Mouth: Mucous membranes are moist       Pharynx: Oropharynx is clear  Cardiovascular:      Rate and Rhythm: Normal rate  Pulses: Normal pulses  Heart sounds: Normal heart sounds, S1 normal and S2 normal    Pulmonary:      Effort: Pulmonary effort is normal       Breath sounds: Normal breath sounds and air entry  No stridor  Musculoskeletal:      Right shoulder: Normal       Left shoulder: Normal       Right forearm: Swelling, laceration, tenderness and bony tenderness present  No deformity  Left forearm: Normal       Right wrist: No tenderness  Normal pulse  Left wrist: Normal       Right hand: No tenderness or bony tenderness  Normal strength  Normal sensation  Normal capillary refill  Normal pulse  Arms:       Cervical back: Neck supple  No signs of trauma  No pain with movement, spinous process tenderness or muscular tenderness  Comments: Normal sensation of right arm  No foreign body noted in wounds  Skin:     General: Skin is warm  Capillary Refill: Capillary refill takes less than 2 seconds  Neurological:      Mental Status: He is alert  GCS: GCS eye subscore is 4  GCS verbal subscore is 5  GCS motor subscore is 6  Psychiatric:         Mood and Affect: Mood normal          Behavior: Behavior normal          Thought Content: Thought content normal          Judgment: Judgment normal      Laceration repair    Date/Time: 4/7/2022 12:32 PM  Performed by: REGINALDO Tsang  Authorized by: REGINALDO Tsang   Consent: Verbal consent obtained  Risks and benefits: risks, benefits and alternatives were discussed  Consent given by: patient  Patient understanding: patient states understanding of the procedure being performed  Radiology Images displayed and confirmed  If images not available, report reviewed: imaging studies available  Patient identity confirmed: verbally with patient  Body area: upper extremity  Location details: right lower arm  Laceration length: 1 cm  Foreign bodies: no foreign bodies  Tendon involvement: none  Nerve involvement: none  Vascular damage: no  Anesthesia: local infiltration    Anesthesia:  Local Anesthetic: lidocaine 1% with epinephrine    Sedation:  Patient sedated: no      Wound Dehiscence:  Superficial Wound Dehiscence: simple closure      Procedure Details:  Preparation: Patient was prepped and draped in the usual sterile fashion    Irrigation method: jet lavage  Amount of cleaning: extensive  Skin closure: 4-0 nylon  Number of sutures: 2  Technique: simple  Approximation: close  Approximation difficulty: simple  Dressing: non-adhesive packing strip and gauze roll  Patient tolerance: patient tolerated the procedure well with no immediate complications  Comments: Patient aware that avulsed area in the center of the laceration with scab over time  Bleeding stopped after laceration repaired  Bacitracin and band-aid applied to skin tear on right palm

## 2022-04-18 ENCOUNTER — OFFICE VISIT (OUTPATIENT)
Dept: URGENT CARE | Facility: CLINIC | Age: 42
End: 2022-04-18
Payer: COMMERCIAL

## 2022-04-18 VITALS
OXYGEN SATURATION: 98 % | TEMPERATURE: 98.6 F | SYSTOLIC BLOOD PRESSURE: 131 MMHG | HEART RATE: 87 BPM | DIASTOLIC BLOOD PRESSURE: 78 MMHG | RESPIRATION RATE: 18 BRPM

## 2022-04-18 DIAGNOSIS — Z48.02 ENCOUNTER FOR REMOVAL OF SUTURES: Primary | ICD-10-CM

## 2022-04-18 PROCEDURE — G0382 LEV 3 HOSP TYPE B ED VISIT: HCPCS

## 2022-04-18 NOTE — PATIENT INSTRUCTIONS
Keep your wound clean and covered  Can use neosporin OTC as directed on bottle  Your laceration and avulsion are healing appropriately  PCP follow-up in 3-5 days  Proceed to the ER if symptoms worsen  Acute Wound Care   AMBULATORY CARE:   An acute wound  is an injury that causes a break in the skin  An acute wound can happen suddenly, last a short time, and may heal on its own  Common signs and symptoms of an acute wound:   · A cut, tear, or gash in your skin    · Bleeding, swelling, pain, or trouble moving the affected area    · Dirt or foreign objects inside the wound     · Milky, yellow, green, or brown pus in the wound     · Red, tender, or warm area around the pus    · Fever  Seek care immediately if:   · You have pus or a foul odor coming from the wound  · You have sudden trouble breathing or chest pain  · Blood soaks through your bandage  Contact your healthcare provider if:   · You have muscle, joint, or body aches, sweating, or a fever  · You have more swelling, redness, or bleeding in your wound  · Your skin is itchy, swollen, or you have a rash  · You have questions or concerns about your condition or care  Treatment for an acute wound  may include any of the following:  · Cleansing  is done with soap and water to wash away germs and decrease the risk of infection  Sterile water further cleans the wound  The cleaning is done under high pressure with a catheter tip and large syringe  A solution that kills germs may also be used  · Debridement  is done to clean and remove objects, dirt, or dead tissues from the open wound  Healthcare providers may also drain the wound to clean out pus  · Closure of the wound  is done with stitches, staples, skin adhesive, or other treatments  This may be done if the wound is wide or deep  Stitches may be needed if the wound is in an area that moves a lot, such as the hands, feet, and joints   Stitches may help to keep the wound from getting infected  They may also decrease the amount of scarring you have  Some wounds may heal better without stitches  Wound care:   · If your wound was closed with thin strips of medical tape, keep them clean and dry  The strips of medical tape will fall off on their own  Do not pull them off  · Keep the bandage clean and dry  Do not remove the bandage over your wound unless your healthcare provider says it is okay  · Wash your hands before and after you take care of your wound to prevent infection  · Clean the wound as directed  If you cannot reach the wound, have someone help you  · If you have packing, make sure all the gauze used to pack the wound is taken out and replaced as directed  Keep track of how many gauze dressings are placed inside the wound  Follow up with your healthcare provider as directed:  Write down your questions so you remember to ask them during your visits  © 2016 [employer] is for End User's use only and may not be sold, redistributed or otherwise used for commercial purposes  All illustrations and images included in CareNotes® are the copyrighted property of [employer]  or You [employer]  The above information is an  only  It is not intended as medical advice for individual conditions or treatments  Talk to your doctor, nurse or pharmacist before following any medical regimen to see if it is safe and effective for you

## 2022-04-18 NOTE — PROGRESS NOTES
[facility]        NAME: Mary Pemberton is a 37 y o  male  : 1980    MRN: [id number]  DATE: 2022  TIME: 11:54 AM      Assessment and Plan     Encounter for removal of sutures [Z48 02]  1  Encounter for removal of sutures           Patient Instructions     Keep your wound clean and covered  Can use neosporin OTC as directed on bottle  Your laceration and avulsion are healing appropriately  PCP follow-up in 3-5 days  Proceed to the ER if symptoms worsen  Chief Complaint     Chief Complaint   Patient presents with    Suture / Staple Removal     here for suture removal, denies pain or drainage at site  History of Present Illness     Patient is a 46-year-old male who presents for suture removal in right forearm  Sutures were placed 2022  Denies pain at suture site  Denies redness or drainage  Denies fever or chills  States it is starting to itch  Denies numbness  Review of Systems     Review of Systems   Constitutional: Negative for chills and fever  Gastrointestinal: Negative for diarrhea, nausea and vomiting  Skin: Positive for wound (right forearm)  Neurological: Negative for numbness  All other systems reviewed and are negative  Current Medications     No current outpatient medications on file  Current Allergies     Allergies as of 2022    (No Known Allergies)              The following portions of the patient's history were reviewed and updated as appropriate: allergies, current medications, past family history, past medical history, past social history, past surgical history and problem list      History reviewed  No pertinent past medical history  History reviewed  No pertinent surgical history  History reviewed  No pertinent family history  Medications have been verified  Objective     /78   Pulse 87   Temp 98 6 °F (37 °C)   Resp 18   SpO2 98%   No LMP for male patient           Physical Exam     Physical Exam  Vitals and nursing note reviewed  Constitutional:       General: He is awake  He is not in acute distress  Appearance: Normal appearance  He is normal weight  He is not ill-appearing, toxic-appearing or diaphoretic  Cardiovascular:      Rate and Rhythm: Normal rate  Pulses: Normal pulses  Heart sounds: Normal heart sounds, S1 normal and S2 normal    Pulmonary:      Effort: Pulmonary effort is normal       Breath sounds: Normal breath sounds and air entry  Musculoskeletal:      Right forearm: Laceration present  No swelling, tenderness or bony tenderness  Right wrist: Normal pulse  Right hand: Normal strength  Normal sensation  There is no disruption of two-point discrimination  Normal capillary refill  Normal pulse  Arms:    Skin:     General: Skin is warm  Capillary Refill: Capillary refill takes less than 2 seconds  Neurological:      Mental Status: He is alert  Psychiatric:         Mood and Affect: Mood normal          Behavior: Behavior normal          Thought Content: Thought content normal          Judgment: Judgment normal      Suture removal    Date/Time: 4/18/2022 11:50 AM  Performed by: REGINALDO Raymond  Authorized by: REGINALDO Raymond   Universal Protocol:  Risks and benefits: risks, benefits and alternatives were discussed  Consent given by: patient  Time out: Immediately prior to procedure a "time out" was called to verify the correct patient, procedure, equipment, support staff and site/side marked as required  Timeout called at: 4/18/2022 11:50 AM   Patient understanding: patient states understanding of the procedure being performed  Patient identity confirmed: verbally with patient        Patient location:  Clinic  Location:     Laterality:  Right    Location:  Upper extremity    Upper extremity location:  Arm    Arm location:  R lower arm  Procedure details:      Tools used:  Suture removal kit    Wound appearance:  No sign(s) of infection, clean and good wound healing    Number of sutures removed:  2  Post-procedure details:     Post-removal:  Antibiotic ointment applied and Band-Aid applied    Patient tolerance of procedure:   Tolerated well, no immediate complications

## 2022-04-19 ENCOUNTER — OFFICE VISIT (OUTPATIENT)
Dept: INTERNAL MEDICINE CLINIC | Facility: CLINIC | Age: 42
End: 2022-04-19
Payer: COMMERCIAL

## 2022-04-19 VITALS
DIASTOLIC BLOOD PRESSURE: 74 MMHG | OXYGEN SATURATION: 97 % | BODY MASS INDEX: 28.79 KG/M2 | TEMPERATURE: 98 F | WEIGHT: 224.3 LBS | HEIGHT: 74 IN | HEART RATE: 102 BPM | SYSTOLIC BLOOD PRESSURE: 128 MMHG

## 2022-04-19 DIAGNOSIS — F41.9 ANXIETY AND DEPRESSION: Primary | ICD-10-CM

## 2022-04-19 DIAGNOSIS — S41.111D LACERATION OF RIGHT UPPER EXTREMITY, SUBSEQUENT ENCOUNTER: ICD-10-CM

## 2022-04-19 DIAGNOSIS — F32.A ANXIETY AND DEPRESSION: Primary | ICD-10-CM

## 2022-04-19 PROBLEM — S41.111A LACERATION OF RIGHT UPPER EXTREMITY: Status: ACTIVE | Noted: 2022-04-19

## 2022-04-19 PROCEDURE — 1036F TOBACCO NON-USER: CPT | Performed by: NURSE PRACTITIONER

## 2022-04-19 PROCEDURE — 99213 OFFICE O/P EST LOW 20 MIN: CPT | Performed by: NURSE PRACTITIONER

## 2022-04-19 PROCEDURE — 3008F BODY MASS INDEX DOCD: CPT | Performed by: NURSE PRACTITIONER

## 2022-04-19 NOTE — PROGRESS NOTES
Assessment/Plan: Will provide note to clear to return to work with no restrictions  Will follow up as needed  No problem-specific Assessment & Plan notes found for this encounter  Problem List Items Addressed This Visit        Other    Anxiety and depression - Primary    Laceration of right upper extremity            Subjective:      Patient ID: Maurisio Malik is a 37 y o  male  Lita Curran is for a follow up visit  He was seen in the  on 4/7 after falling suffering a laceration to his right arm  He did go back yesterday for sutural removal and needed a note to release back to work  He states he has to contact someone through work and does need a note saying no restrictions  He offers no other issues  The following portions of the patient's history were reviewed and updated as appropriate: He  has no past medical history on file  He   Patient Active Problem List    Diagnosis Date Noted    Laceration of right upper extremity 04/19/2022    Routine adult health maintenance 07/13/2021    BMI 27 0-27 9,adult 07/13/2021    Anxiety and depression 07/13/2021    Screening for cardiovascular condition 07/13/2021    Screening for thyroid disorder 07/13/2021     He  has no past surgical history on file  His family history is not on file  He  reports that he has never smoked  He has never used smokeless tobacco  He reports that he does not drink alcohol and does not use drugs  No current outpatient medications on file  No current facility-administered medications for this visit  No current outpatient medications on file prior to visit  No current facility-administered medications on file prior to visit  He has No Known Allergies       Review of Systems   Skin: Positive for wound  All other systems reviewed and are negative          Objective:      /74 (BP Location: Left arm, Patient Position: Sitting, Cuff Size: Large)   Pulse 102   Temp 98 °F (36 7 °C) (Temporal)   Ht 6' 2" (1 88 m)   Wt 102 kg (224 lb 4 8 oz)   SpO2 97%   BMI 28 80 kg/m²          Physical Exam  Constitutional:       Appearance: Normal appearance  He is normal weight  Cardiovascular:      Rate and Rhythm: Normal rate and regular rhythm  Pulses: Normal pulses  Heart sounds: Normal heart sounds  Pulmonary:      Effort: Pulmonary effort is normal       Breath sounds: Normal breath sounds  Skin:     General: Skin is warm and dry  Comments: Healing laceration noted to right outer forearm   Neurological:      General: No focal deficit present  Mental Status: He is alert and oriented to person, place, and time  Mental status is at baseline  Psychiatric:         Mood and Affect: Mood normal          Behavior: Behavior normal          Thought Content:  Thought content normal          Judgment: Judgment normal

## 2022-04-19 NOTE — LETTER
April 19, 2022     Patient: Mary Pemberton  YOB: 1980  Date of Visit: 4/19/2022      To Whom it May Concern:    Mary Pemberton is released to return to work with no restrictions  If you have any questions or concerns, please don't hesitate to call             Sincerely,            Mercedez Cunningham

## 2022-04-22 ENCOUNTER — TELEPHONE (OUTPATIENT)
Dept: INTERNAL MEDICINE CLINIC | Facility: CLINIC | Age: 42
End: 2022-04-22

## 2022-04-22 NOTE — TELEPHONE ENCOUNTER
Martita Antonio called in regards to his paperwork he needs to return to work  He stated that he has been out of work since April 7th and needs to return to work because he does not have any sick time  I informed him that Fadumo Bhat was out of the office today and that she would be back on Tuesday to finish filling out the paperwork  Martita Antonio proceeded to ask what he should do until that time because he has no money to pay for food for his family  I informed him that there was nothing I could do and repeated that Fadumo Bhat will be back in the office on Tuesday to finish the paperwork

## 2022-07-13 ENCOUNTER — TELEPHONE (OUTPATIENT)
Dept: INTERNAL MEDICINE CLINIC | Facility: CLINIC | Age: 42
End: 2022-07-13

## 2022-07-16 PROBLEM — D22.9 CHANGE IN MOLE: Status: ACTIVE | Noted: 2022-07-16

## 2022-07-16 PROBLEM — Z13.6 SCREENING FOR CARDIOVASCULAR CONDITION: Status: RESOLVED | Noted: 2021-07-13 | Resolved: 2022-07-16

## 2022-07-16 PROBLEM — S41.111A LACERATION OF RIGHT UPPER EXTREMITY: Status: RESOLVED | Noted: 2022-04-19 | Resolved: 2022-07-16

## 2022-07-16 PROBLEM — Z13.29 SCREENING FOR THYROID DISORDER: Status: RESOLVED | Noted: 2021-07-13 | Resolved: 2022-07-16

## 2022-07-16 PROBLEM — K42.9 UMBILICAL HERNIA WITHOUT OBSTRUCTION AND WITHOUT GANGRENE: Status: ACTIVE | Noted: 2022-07-16

## 2022-07-16 PROBLEM — E78.2 MIXED HYPERLIPIDEMIA: Status: ACTIVE | Noted: 2022-07-16

## 2022-10-12 PROBLEM — Z00.00 ROUTINE ADULT HEALTH MAINTENANCE: Status: RESOLVED | Noted: 2021-07-13 | Resolved: 2022-10-12

## 2023-11-13 ENCOUNTER — OFFICE VISIT (OUTPATIENT)
Dept: URGENT CARE | Facility: CLINIC | Age: 43
End: 2023-11-13
Payer: COMMERCIAL

## 2023-11-13 VITALS
DIASTOLIC BLOOD PRESSURE: 84 MMHG | OXYGEN SATURATION: 98 % | WEIGHT: 185 LBS | BODY MASS INDEX: 23.74 KG/M2 | RESPIRATION RATE: 16 BRPM | SYSTOLIC BLOOD PRESSURE: 118 MMHG | HEART RATE: 90 BPM | TEMPERATURE: 99.7 F | HEIGHT: 74 IN

## 2023-11-13 DIAGNOSIS — B34.9 VIRAL INFECTION: Primary | ICD-10-CM

## 2023-11-13 DIAGNOSIS — J35.8 TONSIL STONE: ICD-10-CM

## 2023-11-13 DIAGNOSIS — D22.9 SKIN MOLE: ICD-10-CM

## 2023-11-13 PROCEDURE — G0382 LEV 3 HOSP TYPE B ED VISIT: HCPCS | Performed by: NURSE PRACTITIONER

## 2023-11-13 NOTE — LETTER
November 13, 2023     Patient: Adriel Ching   YOB: 1980   Date of Visit: 11/13/2023       To Whom It May Concern: It is my medical opinion that Adriel Ching may return once symptoms have improved for 24 hours. Please excuse for time missed due to acute illness. If you have any questions or concerns, please don't hesitate to call.          Sincerely,        REGINALDO Niño    CC: No Recipients

## 2023-11-13 NOTE — PROGRESS NOTES
[facility]        NAME: Fred Steward is a 44 y.o. male  : 1980    MRN: [id number]  DATE: 2023  TIME: 10:37 AM      Assessment and Plan     Viral infection [B34.9]  1. Viral infection        2. Tonsil stone  Ambulatory Referral to Otolaryngology      3. Skin mole  Ambulatory Referral to Dermatology            Patient Instructions     Patient Instructions   Upper Respiratory Infection   AMBULATORY CARE:   An upper respiratory infection  is also called a cold. Your nose, throat, ears, and sinuses may be affected. You are more likely to get a cold in the winter. Your risk of getting a cold may be increased if you smoke cigarettes or have allergies, such as hay fever. What causes a cold? A cold is caused by a virus. Many viruses can cause a cold, and each is contagious. This means the virus can be easily spread to another person when the sick person coughs or sneezes. The virus can also be spread if you touch an object the virus is on and then touch your eyes, mouth, or nose. Cold symptoms  are usually worst for the first 3 to 5 days. You may have any of the following:  Runny or stuffy nose    Sneezing and coughing    Sore throat or hoarseness    Red, watery, and sore eyes    Fatigue (you feel more tired than usual)    Chills and fever    Headache, body aches, or sore muscles    Call your local emergency number (911 in the [address]) if:   You have chest pain or trouble breathing. Seek care immediately if:   You have a fever over 102ºF (39ºC). Call your doctor if:   You have a low fever. Your sore throat gets worse or you see white or yellow spots in your throat. Your symptoms get worse after 3 to 5 days or are not better in 14 days. You have a rash anywhere on your skin. You have large, tender lumps in your neck. You have thick, green, or yellow drainage from your nose. You cough up thick yellow, green, or bloody mucus. You have a bad earache.     You have questions or concerns about your condition or care. Treatment:  Colds are caused by viruses and do not get better with antibiotics. Most people get better in 7 to 14 days. You may continue to cough for 2 to 3 weeks. The following may help decrease your symptoms:  Decongestants  help reduce nasal congestion and help you breathe more easily. If you take decongestant pills, they may make you feel restless or not able to sleep. Do not use decongestant sprays for more than a few days. Cough suppressants  help reduce coughing. Ask your healthcare provider which type of cough medicine is best for you. NSAIDs , such as ibuprofen, help decrease swelling, pain, and fever. NSAIDs can cause stomach bleeding or kidney problems in certain people. If you take blood thinner medicine, always ask your healthcare provider if NSAIDs are safe for you. Always read the medicine label and follow directions. Acetaminophen  decreases pain and fever. It is available without a doctor's order. Ask how much to take and how often to take it. Follow directions. Read the labels of all other medicines you are using to see if they also contain acetaminophen, or ask your doctor or pharmacist. Acetaminophen can cause liver damage if not taken correctly. Manage a cold:   Rest as much as possible. Slowly start to do more each day. Drink more liquids as directed. Liquids will help thin and loosen mucus so you can cough it up. Liquids will also help prevent dehydration. Liquids that help prevent dehydration include water, fruit juice, and broth. Do not drink liquids that contain caffeine. Caffeine can increase your risk for dehydration. Ask your healthcare provider how much liquid to drink each day. Soothe a sore throat. Gargle with warm salt water. Make salt water by dissolving ¼ teaspoon salt in 1 cup warm water. You may also suck on hard candy or throat lozenges. You may use a sore throat spray. Use a humidifier or vaporizer. Use a cool mist humidifier or a vaporizer to increase air moisture in your home. This may make it easier for you to breathe and help decrease your cough. Use saline nasal drops as directed. These help relieve congestion. Apply petroleum-based jelly around the outside of your nostrils. This can decrease irritation from blowing your nose. Do not smoke. Nicotine and other chemicals in cigarettes and cigars can make your symptoms worse. They can also cause infections such as bronchitis or pneumonia. Ask your healthcare provider for information if you currently smoke and need help to quit. E-cigarettes or smokeless tobacco still contain nicotine. Talk to your healthcare provider before you use these products. Prevent a cold: Wash your hands often. Use soap and water every time you wash your hands. Rub your soapy hands together, lacing your fingers. Use the fingers of one hand to scrub under the nails of the other hand. Wash for at least 20 seconds. Rinse with warm, running water for several seconds. Then dry your hands. Use hand  gel if soap and water are not available. Do not touch your eyes or mouth without washing your hands first.         Cover a sneeze or cough. Use a tissue that covers your mouth and nose. Put the used tissue in the trash right away. Use the bend of your arm if a tissue is not available. Wash your hands well with soap and water or use a hand . Do not stand close to anyone who is sneezing or coughing. Try to stay away from others while you are sick. This is especially important during the first 2 to 3 days when the virus is more easily spread. Wait until a fever, cough, or other symptoms are gone before you return to work or other regular activities. Do not share items while you are sick. This includes food, drinks, eating utensils, and dishes.     Follow up with your doctor as directed:  Write down your questions so you remember to ask them during your visits. © Copyright Lolet[employer]osselin 2023 Information is for End User's use only and may not be sold, redistributed or otherwise used for commercial purposes. The above information is an  only. It is not intended as medical advice for individual conditions or treatments. Talk to your doctor, nurse or pharmacist before following any medical regimen to see if it is safe and effective for you. Follow up with PCP in 3-5 days. Proceed to  ER if symptoms worsen. Chief Complaint     Chief Complaint   Patient presents with    Sore Throat    Cough    Sinusitis     Onset of symptoms of illness on Friday         History of Present Illness     Patient presents to be seen along with his daughter. He had onset of s/s illness on Friday with congestion, sinus pain/pressure, PND, acute cough and low grade temp. Daughter had onset of similar symptoms on Saturday except no fever and also + pink eye for her. Patient also notes a hx of recurrent tonsil stones and asks if tonsil removal would be indicated; discussed ENT f/u. He also notes that he has been meaning to have a few moles checked but needs a new referral; new one placed for f/u--no acute concerns for today re: this. Sore Throat   Associated symptoms include coughing. Cough  Associated symptoms include a fever and a sore throat. Sinusitis  Associated symptoms include coughing, sinus pressure and a sore throat. Review of Systems     Review of Systems   Constitutional:  Positive for fever. HENT:  Positive for sinus pressure, sinus pain and sore throat. Respiratory:  Positive for cough. All other systems reviewed and are negative. Current Medications     No current outpatient medications on file.     Current Allergies     Allergies as of 11/13/2023    (No Known Allergies)              The following portions of the patient's history were reviewed and updated as appropriate: allergies, current medications, past family history, past medical history, past social history, past surgical history and problem list.     History reviewed. No pertinent past medical history. History reviewed. No pertinent surgical history. History reviewed. No pertinent family history. Medications have been verified. Objective     /84 (BP Location: Right arm, Patient Position: Sitting, Cuff Size: Standard)   Pulse 90   Temp 99.7 °F (37.6 °C)   Resp 16   Ht 6' 2" (1.88 m)   Wt 83.9 kg (185 lb)   SpO2 98%   BMI 23.75 kg/m²   No LMP for male patient. Physical Exam     Physical Exam  Vitals and nursing note reviewed. Constitutional:       General: He is not in acute distress. Appearance: Normal appearance. He is well-developed and well-groomed. He is ill-appearing (mild). He is not toxic-appearing or diaphoretic. HENT:      Head: Normocephalic and atraumatic. Right Ear: Tympanic membrane and ear canal normal. No tenderness. No middle ear effusion. Left Ear: Ear canal normal. Tenderness present. A middle ear effusion is present. Nose: Congestion present. Right Sinus: Maxillary sinus tenderness and frontal sinus tenderness present. Left Sinus: Maxillary sinus tenderness and frontal sinus tenderness present. Mouth/Throat:      Mouth: Mucous membranes are moist.      Pharynx: Oropharynx is clear. Uvula midline. No oropharyngeal exudate or posterior oropharyngeal erythema. Tonsils: No tonsillar exudate or tonsillar abscesses. 1+ on the right. 1+ on the left. Eyes:      Pupils: Pupils are equal, round, and reactive to light. Cardiovascular:      Rate and Rhythm: Normal rate and regular rhythm. Heart sounds: Normal heart sounds. No murmur heard. No friction rub. No gallop. Pulmonary:      Effort: Pulmonary effort is normal. No tachypnea, bradypnea, accessory muscle usage or respiratory distress. Breath sounds: Normal breath sounds. No stridor.  No decreased breath sounds, wheezing, rhonchi or rales. Chest:      Chest wall: No tenderness. Abdominal:      General: Bowel sounds are normal. There is no distension. Palpations: Abdomen is soft. Tenderness: There is no abdominal tenderness. Musculoskeletal:         General: Normal range of motion. Cervical back: Normal range of motion and neck supple. Skin:     General: Skin is warm and dry. Capillary Refill: Capillary refill takes less than 2 seconds. Neurological:      General: No focal deficit present. Mental Status: He is alert and oriented to person, place, and time. Psychiatric:         Mood and Affect: Mood normal.         Behavior: Behavior normal. Behavior is cooperative. Thought Content:  Thought content normal.         Judgment: Judgment normal.

## 2023-11-13 NOTE — PATIENT INSTRUCTIONS
Upper Respiratory Infection   AMBULATORY CARE:   An upper respiratory infection  is also called a cold. Your nose, throat, ears, and sinuses may be affected. You are more likely to get a cold in the winter. Your risk of getting a cold may be increased if you smoke cigarettes or have allergies, such as hay fever. What causes a cold? A cold is caused by a virus. Many viruses can cause a cold, and each is contagious. This means the virus can be easily spread to another person when the sick person coughs or sneezes. The virus can also be spread if you touch an object the virus is on and then touch your eyes, mouth, or nose. Cold symptoms  are usually worst for the first 3 to 5 days. You may have any of the following:  Runny or stuffy nose    Sneezing and coughing    Sore throat or hoarseness    Red, watery, and sore eyes    Fatigue (you feel more tired than usual)    Chills and fever    Headache, body aches, or sore muscles    Call your local emergency number (911 in the [address]) if:   You have chest pain or trouble breathing. Seek care immediately if:   You have a fever over 102ºF (39ºC). Call your doctor if:   You have a low fever. Your sore throat gets worse or you see white or yellow spots in your throat. Your symptoms get worse after 3 to 5 days or are not better in 14 days. You have a rash anywhere on your skin. You have large, tender lumps in your neck. You have thick, green, or yellow drainage from your nose. You cough up thick yellow, green, or bloody mucus. You have a bad earache. You have questions or concerns about your condition or care. Treatment:  Colds are caused by viruses and do not get better with antibiotics. Most people get better in 7 to 14 days. You may continue to cough for 2 to 3 weeks. The following may help decrease your symptoms:  Decongestants  help reduce nasal congestion and help you breathe more easily.  If you take decongestant pills, they may make you feel restless or not able to sleep. Do not use decongestant sprays for more than a few days. Cough suppressants  help reduce coughing. Ask your healthcare provider which type of cough medicine is best for you. NSAIDs , such as ibuprofen, help decrease swelling, pain, and fever. NSAIDs can cause stomach bleeding or kidney problems in certain people. If you take blood thinner medicine, always ask your healthcare provider if NSAIDs are safe for you. Always read the medicine label and follow directions. Acetaminophen  decreases pain and fever. It is available without a doctor's order. Ask how much to take and how often to take it. Follow directions. Read the labels of all other medicines you are using to see if they also contain acetaminophen, or ask your doctor or pharmacist. Acetaminophen can cause liver damage if not taken correctly. Manage a cold:   Rest as much as possible. Slowly start to do more each day. Drink more liquids as directed. Liquids will help thin and loosen mucus so you can cough it up. Liquids will also help prevent dehydration. Liquids that help prevent dehydration include water, fruit juice, and broth. Do not drink liquids that contain caffeine. Caffeine can increase your risk for dehydration. Ask your healthcare provider how much liquid to drink each day. Soothe a sore throat. Gargle with warm salt water. Make salt water by dissolving ¼ teaspoon salt in 1 cup warm water. You may also suck on hard candy or throat lozenges. You may use a sore throat spray. Use a humidifier or vaporizer. Use a cool mist humidifier or a vaporizer to increase air moisture in your home. This may make it easier for you to breathe and help decrease your cough. Use saline nasal drops as directed. These help relieve congestion. Apply petroleum-based jelly around the outside of your nostrils. This can decrease irritation from blowing your nose. Do not smoke.   Nicotine and other chemicals in cigarettes and cigars can make your symptoms worse. They can also cause infections such as bronchitis or pneumonia. Ask your healthcare provider for information if you currently smoke and need help to quit. E-cigarettes or smokeless tobacco still contain nicotine. Talk to your healthcare provider before you use these products. Prevent a cold: Wash your hands often. Use soap and water every time you wash your hands. Rub your soapy hands together, lacing your fingers. Use the fingers of one hand to scrub under the nails of the other hand. Wash for at least 20 seconds. Rinse with warm, running water for several seconds. Then dry your hands. Use hand  gel if soap and water are not available. Do not touch your eyes or mouth without washing your hands first.         Cover a sneeze or cough. Use a tissue that covers your mouth and nose. Put the used tissue in the trash right away. Use the bend of your arm if a tissue is not available. Wash your hands well with soap and water or use a hand . Do not stand close to anyone who is sneezing or coughing. Try to stay away from others while you are sick. This is especially important during the first 2 to 3 days when the virus is more easily spread. Wait until a fever, cough, or other symptoms are gone before you return to work or other regular activities. Do not share items while you are sick. This includes food, drinks, eating utensils, and dishes. Follow up with your doctor as directed:  Write down your questions so you remember to ask them during your visits. © Copyright Ann[employer] 2023 Information is for End User's use only and may not be sold, redistributed or otherwise used for commercial purposes. The above information is an  only. It is not intended as medical advice for individual conditions or treatments.  Talk to your doctor, nurse or pharmacist before following any medical regimen to see if it is safe and effective for you.

## 2024-04-15 ENCOUNTER — TELEPHONE (OUTPATIENT)
Dept: INTERNAL MEDICINE CLINIC | Facility: CLINIC | Age: 44
End: 2024-04-15

## 2024-04-15 NOTE — TELEPHONE ENCOUNTER
Patient is calling asking who he would need to see to get a vasectomy.    Spoke to provider she states urology

## 2024-04-16 ENCOUNTER — TELEPHONE (OUTPATIENT)
Dept: INTERNAL MEDICINE CLINIC | Facility: CLINIC | Age: 44
End: 2024-04-16

## 2024-04-16 DIAGNOSIS — Z30.09 VASECTOMY EVALUATION: Primary | ICD-10-CM

## 2024-04-17 DIAGNOSIS — Z30.09 VASECTOMY EVALUATION: Primary | ICD-10-CM

## 2024-08-12 ENCOUNTER — CONSULT (OUTPATIENT)
Dept: UROLOGY | Facility: CLINIC | Age: 44
End: 2024-08-12
Payer: COMMERCIAL

## 2024-08-12 ENCOUNTER — TELEPHONE (OUTPATIENT)
Age: 44
End: 2024-08-12

## 2024-08-12 VITALS
WEIGHT: 203.2 LBS | SYSTOLIC BLOOD PRESSURE: 132 MMHG | OXYGEN SATURATION: 99 % | HEART RATE: 91 BPM | RESPIRATION RATE: 16 BRPM | DIASTOLIC BLOOD PRESSURE: 84 MMHG | HEIGHT: 74 IN | TEMPERATURE: 97.7 F | BODY MASS INDEX: 26.08 KG/M2

## 2024-08-12 DIAGNOSIS — Z30.09 VASECTOMY EVALUATION: Primary | ICD-10-CM

## 2024-08-12 PROCEDURE — 99203 OFFICE O/P NEW LOW 30 MIN: CPT

## 2024-08-12 RX ORDER — ALPRAZOLAM 2 MG
2 TABLET ORAL ONCE
Qty: 1 TABLET | Refills: 0 | Status: SHIPPED | OUTPATIENT
Start: 2024-08-12 | End: 2024-08-12

## 2024-08-12 RX ORDER — SILDENAFIL 100 MG/1
100 TABLET, FILM COATED ORAL
COMMUNITY
Start: 2024-04-30

## 2024-08-12 NOTE — PROGRESS NOTES
8/12/2024      No chief complaint on file.        Assessment and Plan    44 y.o. male managed by new patient    1. Desire for elective sterilization  - exam today as below  - informed consent signed today  - continue contraception  - rx xanax with  to/from on appt date  - shave scrotal/pubic hair day prior to appt date    Return for vasectomy as scheduled.      History of Present Illness  Carlo Cuellar is a 44 y.o. male here for evaluation of VASECTOMY CONSULT    History of genitourinary or groin trauma or surgery- No  Fathered children- Yes (3)  Personal and/or mutual desire for permanent sterility- Single  Current contraceptive method- condoms  Work/manual labor/lifting- Yes ()   Voiding issues- none  Bleeding issues/thinners- none  Allergies to lidocaine/marcaine/betadine/chromic- none    The patient presents requesting elective sterilization vasectomy.     We discussed that vasectomy is in operation performed in the office in order to provide elective sterilization. This procedure should be considered a permanent option. Although there are subspecialists who perform vasectomy reversals, these operations are not 100% successful and are often not covered by insurance meaning they can come with a large out-of-pocket cost. The patient understands this.     We reviewed the procedure in depth. Risk and benefits of the procedure were discussed and reviewed. Informed consent was obtained in the office today. The patient was prescribed a benzodiazepine to take one hour prior to the procedure to assist with his comfort.  He understands that he will require transportation to and from the office that day if he is to use the benzodiazepine.       He also understands he will require semen analysis testing at 8 weeks post procedure to ensure full sterilization.  In the interim, he will require contraception during intercourse to avoid an undesired pregnancy.     Usually, patients are out of work for  2-3 days. We recommend tight fitting scrotal support following the procedure along with ice packs applied to the scrotum 15 minutes on and 15 minutes off for the first 24 hours. We discussed that we do send the patient home with short course of anti-inflammatory and/or narcotic pain medication.     After this discussion, the patient agrees to proceed. We will schedule him in the near future.    He agrees to take oral sedative - xanax 2mg one hour prior to procedure.        Review of Systems   Constitutional:  Negative for chills and fever.   HENT:  Negative for congestion and sore throat.    Respiratory:  Negative for cough and shortness of breath.    Cardiovascular:  Negative for chest pain and leg swelling.   Gastrointestinal:  Negative for abdominal pain, constipation and diarrhea.   Genitourinary:  Negative for difficulty urinating, dysuria, frequency, hematuria and urgency.   Musculoskeletal:  Negative for back pain and gait problem.   Skin:  Negative for wound.   Allergic/Immunologic: Negative for immunocompromised state.   Hematological:  Does not bruise/bleed easily.                Vitals  There were no vitals filed for this visit.    Physical Exam  Vitals reviewed.   Constitutional:       General: He is not in acute distress.     Appearance: Normal appearance. He is not ill-appearing or toxic-appearing.   HENT:      Head: Normocephalic and atraumatic.   Eyes:      General: No scleral icterus.     Conjunctiva/sclera: Conjunctivae normal.   Cardiovascular:      Rate and Rhythm: Normal rate.   Pulmonary:      Effort: Pulmonary effort is normal. No respiratory distress.   Abdominal:      Tenderness: There is no right CVA tenderness or left CVA tenderness.      Hernia: No hernia is present.   Genitourinary:     Comments: Normal circumcised phallus, testicles are descended equally bilaterally, smooth, symmetric.  Bilateral vas deferens is easily palpable, normal exam.    Musculoskeletal:      Cervical back:  Normal range of motion.      Right lower leg: No edema.      Left lower leg: No edema.   Skin:     General: Skin is warm and dry.      Coloration: Skin is not jaundiced or pale.   Neurological:      General: No focal deficit present.      Mental Status: He is alert and oriented to person, place, and time. Mental status is at baseline.      Gait: Gait normal.   Psychiatric:         Mood and Affect: Mood normal.         Behavior: Behavior normal.         Thought Content: Thought content normal.         Judgment: Judgment normal.         General: Well appearing, no distress, appears stated age.  HEENT:  Normocephalic, atraumatic. Conjunctiva clear.  Respiratory: Nonlabored respirations, no wheeze or cough  Abdomen:  Soft nontender without hernia. No suprapubic or CVA tenderness.  Genitourinary: Circumcised penis, normal phallus, orthotopic patent meatus.  Testes smooth descended bilaterally into the scrotum nontender with no palpable mass.  Palpably normal spermatic cord and vas deferens bilaterally.  Musculoskeletal:  Normal range of motion and gait without defecit.  Neuro: No gross neurologic defect or abnormality. Steady unassisted gait. Speech and affect normal.  Dermatologic: skin warm, dry; no rash erythema or ecchymosis      Past History  No past medical history on file.  Social History     Socioeconomic History    Marital status: /Civil Union     Spouse name: Not on file    Number of children: Not on file    Years of education: Not on file    Highest education level: Not on file   Occupational History    Not on file   Tobacco Use    Smoking status: Never    Smokeless tobacco: Never   Vaping Use    Vaping status: Never Used   Substance and Sexual Activity    Alcohol use: Yes     Comment: Socially    Drug use: Never    Sexual activity: Not on file   Other Topics Concern    Not on file   Social History Narrative    Not on file     Social Determinants of Health     Financial Resource Strain: Not on file  "  Food Insecurity: Not on file   Transportation Needs: Not on file   Physical Activity: Not on file   Stress: Not on file   Social Connections: Not on file   Intimate Partner Violence: Not on file   Housing Stability: Not on file     Social History     Tobacco Use   Smoking Status Never   Smokeless Tobacco Never     No family history on file.    The following portions of the patient's history were reviewed and updated as appropriate: allergies, current medications, past medical history, past social history, past surgical history and problem list.    Results  No results found for this or any previous visit (from the past 1 hour(s)).]  No results found for: \"PSA\"  Lab Results   Component Value Date    CALCIUM 9.4 07/13/2021    K 4.3 07/13/2021    CO2 27 07/13/2021     07/13/2021    BUN 14 07/13/2021    CREATININE 1.02 07/13/2021     Lab Results   Component Value Date    WBC 5.77 07/13/2021    HGB 16.3 07/13/2021    HCT 48.5 07/13/2021    MCV 91 07/13/2021     07/13/2021           Please Note:  Voice dictation software has been used to create this document. There may be inadvertent transcriptions errors.           REGINALDO Cartwright 08/12/24       "

## 2024-08-12 NOTE — TELEPHONE ENCOUNTER
Pt calling in regards to vasectomy procedure. Pt was scheduled for next available in [place] on 11/26.     Pt requesting sooner appt in [place] office if available. Pt scheduled for 10/18 @ 830 in [place] with Dr Nazario.

## 2024-08-12 NOTE — PROGRESS NOTES
Called and LVM appt scheduled for next available at [place] 11/26. There are sooner but at a different office please call back to confirm or tell us more about your scheduling preferences. CB# given

## 2024-08-14 ENCOUNTER — TELEPHONE (OUTPATIENT)
Dept: UROLOGY | Facility: MEDICAL CENTER | Age: 44
End: 2024-08-14

## 2024-08-14 NOTE — TELEPHONE ENCOUNTER
Patient called in and scheduled the below appointment:    Date: 10/18/2024 Status: [facility]   Time: 8:30 AM Length: 30   Visit Type: VASECTOMY [facility] [[id number]] Copay: $40.00   Provider: Raj Nazario MD Department: [facility] [place]

## 2024-08-14 NOTE — TELEPHONE ENCOUNTER
----- Message from Danika URBANO sent at 8/12/2024  8:20 AM EDT -----    ----- Message -----  From: REGINALDO Cartwright  Sent: 8/12/2024   8:10 AM EDT  To: [facility]    Please call patient to schedule vasectomy at [facility]

## 2025-01-26 ENCOUNTER — OFFICE VISIT (OUTPATIENT)
Dept: URGENT CARE | Facility: CLINIC | Age: 45
End: 2025-01-26
Payer: COMMERCIAL

## 2025-01-26 VITALS
WEIGHT: 209 LBS | TEMPERATURE: 98.9 F | HEART RATE: 93 BPM | HEIGHT: 74 IN | DIASTOLIC BLOOD PRESSURE: 70 MMHG | SYSTOLIC BLOOD PRESSURE: 113 MMHG | OXYGEN SATURATION: 97 % | BODY MASS INDEX: 26.82 KG/M2 | RESPIRATION RATE: 20 BRPM

## 2025-01-26 DIAGNOSIS — G89.29 CHRONIC PAIN OF LEFT KNEE: Primary | ICD-10-CM

## 2025-01-26 DIAGNOSIS — M25.562 CHRONIC PAIN OF LEFT KNEE: Primary | ICD-10-CM

## 2025-01-26 PROCEDURE — 96372 THER/PROPH/DIAG INJ SC/IM: CPT

## 2025-01-26 PROCEDURE — 99213 OFFICE O/P EST LOW 20 MIN: CPT

## 2025-01-26 RX ORDER — KETOROLAC TROMETHAMINE 30 MG/ML
15 INJECTION, SOLUTION INTRAMUSCULAR; INTRAVENOUS ONCE
Status: COMPLETED | OUTPATIENT
Start: 2025-01-26 | End: 2025-01-26

## 2025-01-26 RX ADMIN — KETOROLAC TROMETHAMINE 15 MG: 30 INJECTION, SOLUTION INTRAMUSCULAR; INTRAVENOUS at 14:14

## 2025-01-26 NOTE — PROGRESS NOTES
[facility]        NAME: Carlo Reina is a 44 y.o. male  : 1980    MRN: [id number]  DATE: 2025  TIME: 2:24 PM    Assessment and Plan   Chronic pain of left knee [M25.562, G89.29]  1. Chronic pain of left knee  Ambulatory Referral to Orthopedic Surgery    ketorolac (TORADOL) injection 15 mg        Suspect left knee osteoarthritis versus bursitis.  Will place orthopedics referral as this is a chronic issue for the patient.  Patient requesting Toradol injection in office.  Toradol injection given.  Recommend supportive care at home at this time with close PCP follow-up for no improvement or worsening of symptoms.  Patient agreeable and understands current treatment plan.    Patient Instructions     Patient Instructions   You were given a Toradol shot in the office today please do not take any ibuprofen for at least 8 hours after the shot.  May take OTC Tylenol or Ibuprofen as needed for fevers or pain.    May apply OTC Lidoderm, Biofreeze, IcyHot, etc. as needed for pain.   Please follow up with orthopedics. Referral placed.      RICE is an acronym for a first aid treatment method that involves rest, ice, compression, and elevation.  It is commonly used in the treatment of sprains, strains, contusions, dislocations, or uncomplicated fractures.  It can help to relieve pain and swelling, promote flexibility, and speed up healing.     Rest - avoid using the injured area and stop the activity that caused the injury  Ice - apply an ice pack or cold gel pack wrapped in a towel to the injured area for 20 minutes every 4 hours   Compression - wrap the injured area with an elastic bandage (ACE) to reduce swelling and provide support  Elevation - keep the injured area raised above the level of the heart to drain fluid and reduce swelling     If your symptoms do not improve with our current treatment plan or worsen, please schedule an appointment with your PCP or proceed to the ED.            Follow up with PCP in 3-5 days.  Proceed to  ER if symptoms worsen.    Chief Complaint     Chief Complaint   Patient presents with   • Knee Pain     Left sided knee pain, for one week. Has been wearing different boots than usual. Had bilateral knee injection 3 months ago. Requesting IM injection of anti inflammatory. Did not take any OTC meds today          History of Present Illness       44-year-old male presents to the clinic for evaluation of left knee swelling x 1 week.  Patient denies any trauma or injury to the knee.  He reports soreness and pain to the knee as well.  He rates his pain a 7 out of 10 with certain movements.  He states the pain can sometimes radiate down to his lower leg.  He states his knee pain gets better with rest however it does stiffen up.  Patient denies any fevers, chills, chest pain, shortness of breath, nausea, vomiting, diarrhea, bruising, redness, numbness or tingling to the left lower extremity.  He states he has been applying hydrocortisone cream to the area and applying ice with mild relief of symptoms.  He also reports taking OTC Tylenol with mild relief of symptoms.  Of note, patient reports he has had chronic problems with knee swelling however this is the first time the swelling has not completely resolved after a few days.            Knee Pain   Pertinent negatives include no numbness.       Review of Systems   Review of Systems   Constitutional:  Negative for chills and fever.   HENT:  Negative for congestion, ear pain and sore throat.    Respiratory:  Negative for cough and shortness of breath.    Cardiovascular:  Negative for chest pain.   Gastrointestinal:  Negative for diarrhea, nausea and vomiting.   Musculoskeletal:  Positive for arthralgias (left knee) and joint swelling (left knee).   Skin:  Negative for color change and wound.   Neurological:  Negative for dizziness, weakness, light-headedness, numbness and headaches.         Current Medications       Current  "Outpatient Medications:   •  sildenafil (VIAGRA) 100 mg tablet, Take 100 mg by mouth, Disp: , Rfl:   •  ALPRAZolam (XANAX) 2 MG tablet, Take 1 tablet (2 mg total) by mouth 1 (one) time for 1 dose Take 1 tablet 45 minutes prior to your vasectomy, Disp: 1 tablet, Rfl: 0  No current facility-administered medications for this visit.    Current Allergies     Allergies as of 01/26/2025   • (No Known Allergies)            The following portions of the patient's history were reviewed and updated as appropriate: allergies, current medications, past family history, past medical history, past social history, past surgical history and problem list.     History reviewed. No pertinent past medical history.    History reviewed. No pertinent surgical history.    History reviewed. No pertinent family history.      Medications have been verified.        Objective   /70   Pulse 93   Temp 98.9 °F (37.2 °C)   Resp 20   Ht 6' 2\" (1.88 m)   Wt 94.8 kg (209 lb)   SpO2 97%   BMI 26.83 kg/m²        Physical Exam     Physical Exam  Vitals and nursing note reviewed.   Constitutional:       Appearance: Normal appearance.   HENT:      Head: Normocephalic and atraumatic.   Cardiovascular:      Rate and Rhythm: Normal rate and regular rhythm.      Pulses: Normal pulses.      Heart sounds: Normal heart sounds.   Pulmonary:      Effort: Pulmonary effort is normal.      Breath sounds: Normal breath sounds.   Musculoskeletal:         General: No signs of injury.      Right knee: Normal.      Left knee: Swelling present. No erythema or ecchymosis. Normal range of motion. Tenderness present. No LCL laxity, MCL laxity, ACL laxity or PCL laxity.     Instability Tests: Anterior drawer test negative. Posterior drawer test negative. Anterior Lachman test negative. Medial Johan test negative and lateral Johan test negative.      Comments: Mild amount of swelling noted to the patient's left anterior knee.  Patient reports subjective pain " upon TTP to the anterior and lateral parts of the knee.   Skin:     General: Skin is warm and dry.      Findings: No bruising or erythema.   Neurological:      General: No focal deficit present.      Mental Status: He is alert.   Psychiatric:         Mood and Affect: Mood normal.         Behavior: Behavior normal.

## 2025-01-26 NOTE — PATIENT INSTRUCTIONS
You were given a Toradol shot in the office today please do not take any ibuprofen for at least 8 hours after the shot.  May take OTC Tylenol or Ibuprofen as needed for fevers or pain.    May apply OTC Lidoderm, Biofreeze, IcyHot, etc. as needed for pain.   Please follow up with orthopedics. Referral placed.      RICE is an acronym for a first aid treatment method that involves rest, ice, compression, and elevation.  It is commonly used in the treatment of sprains, strains, contusions, dislocations, or uncomplicated fractures.  It can help to relieve pain and swelling, promote flexibility, and speed up healing.     Rest - avoid using the injured area and stop the activity that caused the injury  Ice - apply an ice pack or cold gel pack wrapped in a towel to the injured area for 20 minutes every 4 hours   Compression - wrap the injured area with an elastic bandage (ACE) to reduce swelling and provide support  Elevation - keep the injured area raised above the level of the heart to drain fluid and reduce swelling     If your symptoms do not improve with our current treatment plan or worsen, please schedule an appointment with your PCP or proceed to the ED.

## 2025-02-03 ENCOUNTER — APPOINTMENT (OUTPATIENT)
Dept: RADIOLOGY | Facility: CLINIC | Age: 45
End: 2025-02-03
Payer: COMMERCIAL

## 2025-02-03 ENCOUNTER — OFFICE VISIT (OUTPATIENT)
Dept: OBGYN CLINIC | Facility: CLINIC | Age: 45
End: 2025-02-03
Payer: COMMERCIAL

## 2025-02-03 VITALS
TEMPERATURE: 97.8 F | BODY MASS INDEX: 26.08 KG/M2 | HEIGHT: 74 IN | HEART RATE: 88 BPM | WEIGHT: 203.2 LBS | OXYGEN SATURATION: 97 %

## 2025-02-03 DIAGNOSIS — M25.562 CHRONIC PAIN OF LEFT KNEE: ICD-10-CM

## 2025-02-03 DIAGNOSIS — M25.462 EFFUSION OF LEFT KNEE: ICD-10-CM

## 2025-02-03 DIAGNOSIS — G89.29 CHRONIC PAIN OF LEFT KNEE: Primary | ICD-10-CM

## 2025-02-03 DIAGNOSIS — M25.562 CHRONIC PAIN OF LEFT KNEE: Primary | ICD-10-CM

## 2025-02-03 DIAGNOSIS — G89.29 CHRONIC PAIN OF LEFT KNEE: ICD-10-CM

## 2025-02-03 PROCEDURE — 73562 X-RAY EXAM OF KNEE 3: CPT

## 2025-02-03 PROCEDURE — 99204 OFFICE O/P NEW MOD 45 MIN: CPT | Performed by: FAMILY MEDICINE

## 2025-02-03 RX ORDER — BENZOCAINE/MENTHOL 6 MG-10 MG
LOZENGE MUCOUS MEMBRANE 4 TIMES DAILY PRN
COMMUNITY

## 2025-02-03 NOTE — PROGRESS NOTES
"[facility] [address] [address] [phone]      Assessment:  1. Chronic pain of left knee  -     XR knee 3 vw left non injury; Future; Expected date: 02/03/2025  -     Ambulatory Referral to Orthopedic Surgery  2. Effusion of left knee      Plan:  Patient Instructions   F/u as needed  Knee compression  Brace as needed.  Consider aspiration/steroid injection  Patient declines knee aspiration today  [facility]  Icing/heat/OTC pain meds as needed.     Return if symptoms worsen or fail to improve.    Chief Complaint:  Chief Complaint   Patient presents with    Left Knee - Pain       Subjective:   HPI    Patient ID: Carlo Reina is a 44 y.o. male     Here c/o L knee pain  Seen in . Note reviewed.  Served in [employer]  Injured in [employer]- parachute landing fall during training op- flipped when landed.  Swells/gives out  No locking  Pain walking sometimes  Motrin PRN- not much help  Sharp pain  Better at rest.  Painful to touch  No recent injury.  Denies rash  He was having pain with flexion but less pain with flexion now.    Review of Systems   Constitutional:  Negative for fatigue and fever.   Respiratory:  Negative for shortness of breath.    Cardiovascular:  Negative for chest pain.   Gastrointestinal:  Negative for abdominal pain and nausea.   Genitourinary:  Negative for dysuria.   Musculoskeletal:  Positive for arthralgias, gait problem and joint swelling.   Skin:  Negative for rash and wound.   Neurological:  Negative for weakness and headaches.       Objective:  Vitals:  Pulse 88   Temp 97.8 °F (36.6 °C) (Tympanic)   Ht 6' 2\" (1.88 m)   Wt 92.2 kg (203 lb 3.2 oz)   SpO2 97%   BMI 26.09 kg/m²     The following portions of the patient's history were reviewed and updated as appropriate: allergies, current medications, past family history, past medical history, past social history, past surgical history, and problem " list.    Physical exam:  Physical Exam  Constitutional:       Appearance: Normal appearance. He is normal weight.   Eyes:      Extraocular Movements: Extraocular movements intact.   Pulmonary:      Effort: Pulmonary effort is normal.   Musculoskeletal:      Cervical back: Normal range of motion.      Left knee: Effusion present.      Instability Tests: Medial Johan test negative and lateral Johan test negative.   Skin:     General: Skin is warm and dry.   Neurological:      General: No focal deficit present.      Mental Status: He is alert and oriented to person, place, and time. Mental status is at baseline.   Psychiatric:         Mood and Affect: Mood normal.         Behavior: Behavior normal.         Thought Content: Thought content normal.         Judgment: Judgment normal.       Left Knee Exam     Tenderness   The patient is experiencing tenderness in the medial joint line.    Range of Motion   The patient has normal left knee ROM.    Tests   Johan:  Medial - negative Lateral - negative  Varus: negative Valgus: negative    Other   Swelling: moderate  Effusion: effusion present          Observations   Left Knee   Positive for effusion.       I have personally reviewed pertinent films in PACS and my interpretation is XR L knee- medial joint space narrowing mod OA. No fx. Effusion present        Alexandre Nunez MD

## 2025-02-03 NOTE — PATIENT INSTRUCTIONS
F/u as needed  Knee compression  Brace as needed.  Consider aspiration/steroid injection  Patient declines knee aspiration today  [facility]  Icing/heat/OTC pain meds as needed.

## 2025-06-10 ENCOUNTER — TELEPHONE (OUTPATIENT)
Dept: INTERNAL MEDICINE CLINIC | Facility: CLINIC | Age: 45
End: 2025-06-10

## 2025-08-04 ENCOUNTER — DOCUMENTATION (OUTPATIENT)
Dept: ADMINISTRATIVE | Facility: OTHER | Age: 45
End: 2025-08-04